# Patient Record
Sex: FEMALE | URBAN - METROPOLITAN AREA
[De-identification: names, ages, dates, MRNs, and addresses within clinical notes are randomized per-mention and may not be internally consistent; named-entity substitution may affect disease eponyms.]

---

## 2020-01-28 ENCOUNTER — OFFICE VISIT (OUTPATIENT)
Dept: ORTHOPEDICS | Facility: CLINIC | Age: 75
End: 2020-01-28
Payer: MEDICARE

## 2020-01-28 VITALS — TEMPERATURE: 98 F | HEIGHT: 66 IN | WEIGHT: 184.38 LBS | BODY MASS INDEX: 29.63 KG/M2

## 2020-01-28 DIAGNOSIS — S42.254A NONDISPLACED FRACTURE OF GREATER TUBEROSITY OF RIGHT HUMERUS, INITIAL ENCOUNTER FOR CLOSED FRACTURE: Primary | ICD-10-CM

## 2020-01-28 PROCEDURE — 1159F PR MEDICATION LIST DOCUMENTED IN MEDICAL RECORD: ICD-10-PCS | Mod: S$GLB,,, | Performed by: ORTHOPAEDIC SURGERY

## 2020-01-28 PROCEDURE — 99202 OFFICE O/P NEW SF 15 MIN: CPT | Mod: ICN,S$GLB,, | Performed by: ORTHOPAEDIC SURGERY

## 2020-01-28 PROCEDURE — 1159F MED LIST DOCD IN RCRD: CPT | Mod: S$GLB,,, | Performed by: ORTHOPAEDIC SURGERY

## 2020-01-28 PROCEDURE — 99202 PR OFFICE/OUTPT VISIT, NEW, LEVL II, 15-29 MIN: ICD-10-PCS | Mod: ICN,S$GLB,, | Performed by: ORTHOPAEDIC SURGERY

## 2020-01-28 RX ORDER — ERGOCALCIFEROL 1.25 MG/1
50000 CAPSULE ORAL
COMMUNITY

## 2020-01-28 RX ORDER — VITAMIN E 268 MG
400 CAPSULE ORAL DAILY
COMMUNITY

## 2020-01-28 RX ORDER — BENAZEPRIL HYDROCHLORIDE 20 MG/1
TABLET ORAL
COMMUNITY
Start: 2020-01-07

## 2020-01-28 RX ORDER — ACETAMINOPHEN 500 MG
500 TABLET ORAL EVERY 6 HOURS PRN
COMMUNITY

## 2020-01-28 RX ORDER — AMLODIPINE BESYLATE 5 MG/1
TABLET ORAL
COMMUNITY
Start: 2020-01-27

## 2020-01-28 RX ORDER — AMOXICILLIN 500 MG
CAPSULE ORAL DAILY
COMMUNITY

## 2020-01-28 RX ORDER — ASPIRIN 81 MG/1
81 TABLET ORAL DAILY
COMMUNITY

## 2020-01-28 RX ORDER — OXYBUTYNIN CHLORIDE 10 MG/1
TABLET, EXTENDED RELEASE ORAL
COMMUNITY
Start: 2020-01-28

## 2020-01-28 RX ORDER — SULFAMETHOXAZOLE AND TRIMETHOPRIM 800; 160 MG/1; MG/1
TABLET ORAL
COMMUNITY
Start: 2020-01-15

## 2020-01-28 RX ORDER — IBUPROFEN 100 MG/5ML
1000 SUSPENSION, ORAL (FINAL DOSE FORM) ORAL DAILY
COMMUNITY

## 2020-01-28 RX ORDER — DESOXIMETASONE 2.5 MG/G
1 CREAM TOPICAL 2 TIMES DAILY
COMMUNITY
Start: 2019-12-23

## 2020-01-28 RX ORDER — ROSUVASTATIN CALCIUM 10 MG/1
TABLET, COATED ORAL
COMMUNITY
Start: 2020-01-07

## 2020-01-28 RX ORDER — LANOLIN ALCOHOL/MO/W.PET/CERES
100 CREAM (GRAM) TOPICAL DAILY
COMMUNITY

## 2020-01-28 RX ORDER — TOLTERODINE 4 MG/1
CAPSULE, EXTENDED RELEASE ORAL
COMMUNITY
Start: 2020-01-24

## 2020-01-28 NOTE — PROGRESS NOTES
Subjective:      Patient ID: Lilliana East is a 74 y.o. female.    Chief Complaint: Pain of the Right Upper Arm    HPI 74-year-old lady who fell 18 days ago and injured her right shoulder.  She was finally x-rayed about a week ago and x-rays show a minimally displaced fracture of the greater tuberosity.  She has been in a sling since then.  She has a previous history of proximal humerus fracture treated with open reduction  Review of Systems   Constitution: Negative for fever and weight loss.   Cardiovascular: Negative for chest pain and leg swelling.   Musculoskeletal: Positive for joint pain and stiffness. Negative for arthritis, joint swelling and muscle weakness.   Gastrointestinal: Negative for change in bowel habit.   Genitourinary: Negative for bladder incontinence and hematuria.   Neurological: Negative for focal weakness, numbness, paresthesias and sensory change.         Objective:      Patient has tenderness with palpation of the proximal humerus.  Range of motion is not attempted because of the fracture. She has normal sensation and normal pulses    Ortho/SPM Exam            Assessment:       Encounter Diagnosis   Name Primary?    Nondisplaced fracture of greater tuberosity of right humerus, initial encounter for closed fracture Yes          Plan:       Lilliana was seen today for pain.    Diagnoses and all orders for this visit:    Nondisplaced fracture of greater tuberosity of right humerus, initial encounter for closed fracture     She is instructed and a pendulum exercise program.  She is encouraged with range of motion of her elbow.  She will be seen back in 2 weeks for x-rays of the shoulder

## 2020-02-11 ENCOUNTER — OFFICE VISIT (OUTPATIENT)
Dept: ORTHOPEDICS | Facility: CLINIC | Age: 75
End: 2020-02-11
Payer: MEDICARE

## 2020-02-11 DIAGNOSIS — S42.254A NONDISPLACED FRACTURE OF GREATER TUBEROSITY OF RIGHT HUMERUS, INITIAL ENCOUNTER FOR CLOSED FRACTURE: Primary | ICD-10-CM

## 2020-02-11 PROCEDURE — 99212 PR OFFICE/OUTPT VISIT, EST, LEVL II, 10-19 MIN: ICD-10-PCS | Mod: ICN,S$GLB,, | Performed by: ORTHOPAEDIC SURGERY

## 2020-02-11 PROCEDURE — 99212 OFFICE O/P EST SF 10 MIN: CPT | Mod: ICN,S$GLB,, | Performed by: ORTHOPAEDIC SURGERY

## 2020-02-11 NOTE — PROGRESS NOTES
Subjective:      Patient ID: Lilliana East is a 74 y.o. female.    Chief Complaint: Injury and Pain of the Right Upper Arm    HPI patient is here for follow-up for her right humeral tuberosity fracture.  She still complains of moderate discomfort.  She is 1 month out    ROS unchanged from prior visit      Objective:      She is  with palpation of the fracture site. Range of motion of the shoulder is not attempted      Ortho/SPM Exam            Assessment:       Encounter Diagnosis   Name Primary?    Nondisplaced fracture of greater tuberosity of right humerus, initial encounter for closed fracture Yes          Plan:       Lilliana was seen today for injury and pain.    Diagnoses and all orders for this visit:    Nondisplaced fracture of greater tuberosity of right humerus, initial encounter for closed fracture  -     X-ray Shoulder 2 or More Views Right; Future     X-ray shows the fracture to begin to consolidate.  Recommend return 2 weeks for x-ray and hopefully start her physical therapy at that time.  She is to continue pendulum exercises in the interim

## 2020-02-25 ENCOUNTER — OFFICE VISIT (OUTPATIENT)
Dept: ORTHOPEDICS | Facility: CLINIC | Age: 75
End: 2020-02-25
Payer: MEDICARE

## 2020-02-25 DIAGNOSIS — S42.254A NONDISPLACED FRACTURE OF GREATER TUBEROSITY OF RIGHT HUMERUS, INITIAL ENCOUNTER FOR CLOSED FRACTURE: Primary | ICD-10-CM

## 2020-02-25 PROCEDURE — 99212 OFFICE O/P EST SF 10 MIN: CPT | Mod: ICN,S$GLB,, | Performed by: ORTHOPAEDIC SURGERY

## 2020-02-25 PROCEDURE — 99212 PR OFFICE/OUTPT VISIT, EST, LEVL II, 10-19 MIN: ICD-10-PCS | Mod: ICN,S$GLB,, | Performed by: ORTHOPAEDIC SURGERY

## 2020-02-25 RX ORDER — METFORMIN HYDROCHLORIDE 500 MG/1
TABLET ORAL
COMMUNITY
Start: 2020-01-15

## 2020-02-25 RX ORDER — TRAMADOL HYDROCHLORIDE 50 MG/1
TABLET ORAL
COMMUNITY
Start: 2020-02-11

## 2020-02-25 RX ORDER — BUPROPION HYDROCHLORIDE 300 MG/1
TABLET ORAL
COMMUNITY
Start: 2020-01-15

## 2020-02-25 RX ORDER — DULOXETIN HYDROCHLORIDE 60 MG/1
CAPSULE, DELAYED RELEASE ORAL
COMMUNITY
Start: 2019-12-20

## 2020-02-25 NOTE — PROGRESS NOTES
Subjective:      Patient ID: Lilliana East is a 74 y.o. female.    Chief Complaint: Injury of the Right Upper Arm    HPI patient is now 6 weeks out from her right greater tuberosity fracture. She states that she still has occasional dull ache especially with weather changes    ROS unchanged from prior visit      Objective:      Passive range of motion shows 100° of flexion and 90° of abduction with 45° of extension.      Ortho/SPM Exam            Assessment:       Encounter Diagnosis   Name Primary?    Nondisplaced fracture of greater tuberosity of right humerus, initial encounter for closed fracture Yes          Plan:       Lilliana was seen today for injury.    Diagnoses and all orders for this visit:    Nondisplaced fracture of greater tuberosity of right humerus, initial encounter for closed fracture  -     X-ray Shoulder 2 or More Views Right; Future     X-ray shows the fracture continues to heal.    Arrangements were made for a course of physical therapy.  Return 3 weeks for motion check

## 2024-05-06 ENCOUNTER — TELEPHONE (OUTPATIENT)
Dept: UROLOGY | Facility: CLINIC | Age: 79
End: 2024-05-06
Payer: MEDICARE

## 2024-05-07 DIAGNOSIS — N39.0 UTI (URINARY TRACT INFECTION): Primary | ICD-10-CM

## 2024-05-10 ENCOUNTER — OFFICE VISIT (OUTPATIENT)
Dept: UROLOGY | Facility: CLINIC | Age: 79
End: 2024-05-10
Payer: MEDICARE

## 2024-05-10 VITALS
HEIGHT: 66 IN | SYSTOLIC BLOOD PRESSURE: 159 MMHG | HEART RATE: 86 BPM | BODY MASS INDEX: 29.62 KG/M2 | WEIGHT: 184.31 LBS | DIASTOLIC BLOOD PRESSURE: 83 MMHG

## 2024-05-10 DIAGNOSIS — N39.0 UTI (URINARY TRACT INFECTION): ICD-10-CM

## 2024-05-10 DIAGNOSIS — N39.0 RECURRENT UTI (URINARY TRACT INFECTION): Primary | ICD-10-CM

## 2024-05-10 DIAGNOSIS — N39.41 URGE INCONTINENCE OF URINE: ICD-10-CM

## 2024-05-10 LAB
BILIRUBIN, UA POC OHS: NEGATIVE
BLOOD, UA POC OHS: ABNORMAL
CLARITY, UA POC OHS: CLEAR
COLOR, UA POC OHS: YELLOW
GLUCOSE, UA POC OHS: NEGATIVE
KETONES, UA POC OHS: NEGATIVE
LEUKOCYTES, UA POC OHS: ABNORMAL
NITRITE, UA POC OHS: NEGATIVE
PH, UA POC OHS: 5.5
PROTEIN, UA POC OHS: ABNORMAL
SPECIFIC GRAVITY, UA POC OHS: <=1.005
UROBILINOGEN, UA POC OHS: 0.2

## 2024-05-10 PROCEDURE — 81003 URINALYSIS AUTO W/O SCOPE: CPT | Mod: QW,S$GLB,, | Performed by: FAMILY MEDICINE

## 2024-05-10 PROCEDURE — 99204 OFFICE O/P NEW MOD 45 MIN: CPT | Mod: S$GLB,,, | Performed by: FAMILY MEDICINE

## 2024-05-10 RX ORDER — PANTOPRAZOLE SODIUM 40 MG/1
TABLET, DELAYED RELEASE ORAL
COMMUNITY

## 2024-05-10 RX ORDER — SODIUM, POTASSIUM,MAG SULFATES 17.5-3.13G
SOLUTION, RECONSTITUTED, ORAL ORAL
COMMUNITY
Start: 2023-11-16

## 2024-05-10 RX ORDER — CEFDINIR 300 MG/1
CAPSULE ORAL
COMMUNITY
Start: 2024-05-06

## 2024-05-10 RX ORDER — GLUCOSAMINE/CHONDR SU A SOD 750-600 MG
TABLET ORAL
COMMUNITY

## 2024-05-10 RX ORDER — DULAGLUTIDE 0.75 MG/.5ML
INJECTION, SOLUTION SUBCUTANEOUS
COMMUNITY
Start: 2024-02-01

## 2024-05-10 RX ORDER — CLOBETASOL PROPIONATE 0.5 MG/G
EMULSION TOPICAL
COMMUNITY

## 2024-05-10 RX ORDER — OLMESARTAN MEDOXOMIL 40 MG/1
TABLET ORAL
COMMUNITY
Start: 2024-04-01

## 2024-05-10 RX ORDER — TRIAMCINOLONE ACETONIDE 1 MG/G
PASTE DENTAL
COMMUNITY

## 2024-05-10 RX ORDER — RISANKIZUMAB-RZAA 150 MG/ML
INJECTION SUBCUTANEOUS
COMMUNITY
Start: 2023-08-01

## 2024-05-10 RX ORDER — ACETAMINOPHEN AND PHENYLEPHRINE HCL 325; 5 MG/1; MG/1
TABLET ORAL
COMMUNITY

## 2024-05-11 RX ORDER — NITROFURANTOIN 25; 75 MG/1; MG/1
100 CAPSULE ORAL DAILY
Qty: 90 CAPSULE | Refills: 0 | Status: SHIPPED | OUTPATIENT
Start: 2024-05-11 | End: 2024-08-09

## 2024-05-11 NOTE — PROGRESS NOTES
Subjective:       Patient ID: Lilliana East is a 78 y.o. female.    Chief Complaint: Urinary Tract Infection      HPI: 78-year-old 78-year-old female patient presenting to the clinic to establish care for recurrent urinary tract infections.  Patient states she experiences a urinary tract infection approximately every month.  She recently just completed cefdinir.  She also has to wear a proximally 4 or more depends per day for urinary incontinence.  She is tried Ditropan and Myrbetriq 50 mg with no improvement in symptoms.  Patient is unsure whether or not urine cultures have been completed in the past.  Symptoms with infection include urgency, lower back pain, and frequency.         Past Medical History:   Past Medical History:   Diagnosis Date    Cancer     Depression     High cholesterol     Hypertension        Past Surgical Historical:   Past Surgical History:   Procedure Laterality Date    BREAST RECONSTRUCTION      HUMERUS FRACTURE SURGERY      MASTECTOMY          Medications:   Medication List with Changes/Refills   Current Medications    ACETAMINOPHEN (TYLENOL) 500 MG TABLET    Take 500 mg by mouth every 6 (six) hours as needed for Pain.    ASCORBIC ACID, VITAMIN C, (VITAMIN C) 1000 MG TABLET    Take 1,000 mg by mouth once daily.    ASCORBIC ACID/COLLAGEN HYDR (COLLAGEN PLUS VITAMIN C ORAL)    Take by mouth.    BUPROPION (WELLBUTRIN XL) 300 MG 24 HR TABLET        CEFDINIR (OMNICEF) 300 MG CAPSULE        CHOLECALCIFEROL, VITAMIN D3, 125 MCG (5,000 UNIT) TBDL    1 tab(s) orally once a day    CLOBETASOL-EMOLLIENT 0.05 % CREA    1 tara applied topically 2 times a day    CYANOCOBALAMIN (VITAMIN B-12) 1000 MCG TABLET    Take 100 mcg by mouth once daily.    CYANOCOBALAMIN-COBAMAMIDE (B-12 PLUS) 5,000-100 MCG SUBL    1 tab(s) sublingually once a day    DESOXIMETASONE (TOPICORT) 0.25 % CREAM    1 application 2 (two) times daily. Apply to affected area    ERGOCALCIFEROL (ERGOCALCIFEROL) 50,000 UNIT CAP    Take 50,000  Units by mouth every 7 days.    GLUCOSAM/CHOND-MSM1/C/BRIELLE/BOR (GLUCOSAMINE-CHOND-MSM COMPLEX ORAL)    Take by mouth.    GLUCOSAMINE HCL 1,500 MG TAB    1 tab(s) orally once a day    OLMESARTAN (BENICAR) 40 MG TABLET    1 tab(s) orally once a day    OMEGA-3 FATTY ACIDS/FISH OIL (FISH OIL-OMEGA-3 FATTY ACIDS) 300-1,000 MG CAPSULE    Take by mouth once daily.    PANTOPRAZOLE (PROTONIX) 40 MG TABLET    1 tab(s) orally once a day    RISANKIZUMAB-RZAA (SKYRIZI) 150 MG/ML SYRG        ROSUVASTATIN (CRESTOR) 10 MG TABLET        SUPREP BOWEL PREP KIT 17.5-3.13-1.6 GRAM SOLR    as directed Oral    TOLTERODINE (DETROL LA) 4 MG 24 HR CAPSULE        TRIAMCINOLONE ACETONIDE 0.1% (KENALOG) 0.1 % PASTE    as directed    TRULICITY 0.75 MG/0.5 ML PEN INJECTOR    as directed subcutaneously once a week    VITAMIN E 400 UNIT CAPSULE    Take 400 Units by mouth once daily.   Discontinued Medications    AMLODIPINE (NORVASC) 5 MG TABLET        ASPIRIN (ECOTRIN) 81 MG EC TABLET    Take 81 mg by mouth once daily.    BENAZEPRIL (LOTENSIN) 20 MG TABLET        DULOXETINE (CYMBALTA) 60 MG CAPSULE    TAKE 1 CAPSULE BY MOUTH EVERY DAY IN THE MORNING    METFORMIN (GLUCOPHAGE) 500 MG TABLET        OXYBUTYNIN (DITROPAN-XL) 10 MG 24 HR TABLET        SULFAMETHOXAZOLE-TRIMETHOPRIM 800-160MG (BACTRIM DS) 800-160 MG TAB        TRAMADOL (ULTRAM) 50 MG TABLET            Past Social History:   Social History     Socioeconomic History    Marital status:    Tobacco Use    Smoking status: Never   Substance and Sexual Activity    Alcohol use: Not Currently    Drug use: Never       Allergies: Review of patient's allergies indicates:  No Known Allergies     Family History: No family history on file.     Review of Systems:  Review of Systems   Constitutional:  Negative for activity change, appetite change, chills, diaphoresis, fatigue, fever and unexpected weight change.   HENT:  Negative for congestion, dental problem, drooling, ear discharge, ear pain,  facial swelling, hearing loss, mouth sores, nosebleeds, postnasal drip, rhinorrhea, sinus pressure, sinus pain, sneezing, sore throat, tinnitus, trouble swallowing and voice change.    Eyes:  Negative for photophobia, pain, discharge, redness, itching and visual disturbance.   Respiratory:  Negative for apnea, cough, choking, chest tightness, shortness of breath, wheezing and stridor.    Cardiovascular:  Negative for chest pain and leg swelling.   Gastrointestinal:  Negative for abdominal distention, abdominal pain, anal bleeding, blood in stool, constipation, diarrhea, nausea, rectal pain and vomiting.   Endocrine: Negative for cold intolerance, heat intolerance, polydipsia, polyphagia and polyuria.   Genitourinary:  Positive for dysuria, frequency, pelvic pain and urgency. Negative for decreased urine volume, difficulty urinating, dyspareunia, enuresis, flank pain, genital sores, hematuria, menstrual problem, vaginal bleeding, vaginal discharge and vaginal pain.   Musculoskeletal:  Negative for arthralgias, back pain, gait problem, joint swelling, myalgias, neck pain and neck stiffness.   Skin:  Negative for color change, pallor, rash and wound.   Allergic/Immunologic: Negative for environmental allergies, food allergies and immunocompromised state.   Neurological:  Negative for dizziness, tremors, seizures, syncope, facial asymmetry, speech difficulty, weakness, light-headedness, numbness and headaches.   Hematological:  Negative for adenopathy. Does not bruise/bleed easily.   Psychiatric/Behavioral:  Negative for agitation, behavioral problems, confusion, decreased concentration, dysphoric mood, hallucinations, self-injury, sleep disturbance and suicidal ideas. The patient is not nervous/anxious and is not hyperactive.        Physical Exam:  Physical Exam  Exam conducted with a chaperone present.   Constitutional:       Appearance: Normal appearance.   HENT:      Head: Normocephalic.      Nose: Nose normal.       Mouth/Throat:      Mouth: Mucous membranes are moist.      Pharynx: Oropharynx is clear.   Eyes:      Extraocular Movements: Extraocular movements intact.      Conjunctiva/sclera: Conjunctivae normal.      Pupils: Pupils are equal, round, and reactive to light.   Cardiovascular:      Rate and Rhythm: Normal rate and regular rhythm.      Pulses: Normal pulses.      Heart sounds: Normal heart sounds.   Pulmonary:      Effort: Pulmonary effort is normal.      Breath sounds: Normal breath sounds.   Abdominal:      General: Abdomen is flat. Bowel sounds are normal.      Palpations: Abdomen is soft.      Hernia: There is no hernia in the left inguinal area or right inguinal area.   Genitourinary:     General: Normal vulva.      Pubic Area: No rash or pubic lice.       Labia:         Right: No rash, tenderness, lesion or injury.         Left: No rash, tenderness, lesion or injury.       Urethra: No prolapse, urethral pain, urethral swelling or urethral lesion.      Rectum: Normal.   Musculoskeletal:         General: Normal range of motion.      Cervical back: Normal range of motion and neck supple.   Lymphadenopathy:      Lower Body: No right inguinal adenopathy. No left inguinal adenopathy.   Skin:     General: Skin is warm and dry.      Capillary Refill: Capillary refill takes less than 2 seconds.   Neurological:      General: No focal deficit present.      Mental Status: She is alert and oriented to person, place, and time. Mental status is at baseline.   Psychiatric:         Mood and Affect: Mood normal.         Behavior: Behavior normal.         Thought Content: Thought content normal.         Judgment: Judgment normal.         Assessment/Plan:     Recurrent urinary tract infection:  Urinalysis suspicious of infection today we will culture her urine and treat based on culture results.  Patient recently completed Omnicef.  At this point we will treat the current infection and begin 90 days of Macrobid following  completion.    Urinary urge incontinence:  PVR 35 mL.  Patient has failed Ditropan and Myrbetriq 50 mg.  Patient provided with 4 weeks of Gemtesa samples.  We will have the patient notify ourf symptoms improve on this medication and we can send a prescription to her pharmacy.  We did discuss Botox if she has not seeing improvement in urinary leakage and urgency.    Follow up in 6 months or sooner if needed.  Instructed patient to complete urine drop-off with our office if symptoms of infection develop.  Problem List Items Addressed This Visit    None  Visit Diagnoses       UTI (urinary tract infection)        Relevant Orders    POCT Urinalysis(Instrument) (Completed)    Urine culture

## 2024-05-12 LAB — URINE CULTURE, ROUTINE: NORMAL

## 2024-05-13 ENCOUNTER — TELEPHONE (OUTPATIENT)
Dept: UROLOGY | Facility: CLINIC | Age: 79
End: 2024-05-13
Payer: MEDICARE

## 2024-05-13 NOTE — TELEPHONE ENCOUNTER
Spoke with pt and informed of results.    ----- Message from Tran Argueta NP sent at 5/13/2024 10:10 AM CDT -----  Please notify patient of urine culture results.  Repeat urinalysis if symptoms of infection develop.    Mixed growth in a urine culture is consistant with normal   urogenital/urethral and/or colonizing bacterial marizol.  There are   several different types of bacteria present, which is usually   indicative of contamination of the urine.

## 2024-05-29 ENCOUNTER — CLINICAL SUPPORT (OUTPATIENT)
Dept: UROLOGY | Facility: CLINIC | Age: 79
End: 2024-05-29
Payer: MEDICARE

## 2024-05-29 ENCOUNTER — TELEPHONE (OUTPATIENT)
Dept: UROLOGY | Facility: CLINIC | Age: 79
End: 2024-05-29

## 2024-05-29 DIAGNOSIS — N39.0 RECURRENT UTI (URINARY TRACT INFECTION): Primary | ICD-10-CM

## 2024-05-29 LAB
BILIRUBIN, UA POC OHS: NEGATIVE
BLOOD, UA POC OHS: ABNORMAL
CLARITY, UA POC OHS: CLEAR
COLOR, UA POC OHS: YELLOW
GLUCOSE, UA POC OHS: NEGATIVE
KETONES, UA POC OHS: NEGATIVE
LEUKOCYTES, UA POC OHS: ABNORMAL
NITRITE, UA POC OHS: NEGATIVE
PH, UA POC OHS: 5.5
PROTEIN, UA POC OHS: 100
SPECIFIC GRAVITY, UA POC OHS: 1.02
UROBILINOGEN, UA POC OHS: 0.2

## 2024-05-29 PROCEDURE — 81003 URINALYSIS AUTO W/O SCOPE: CPT | Mod: QW,S$GLB,, | Performed by: UROLOGY

## 2024-05-29 RX ORDER — CEFDINIR 300 MG/1
300 CAPSULE ORAL DAILY
Qty: 30 CAPSULE | Refills: 6 | Status: SHIPPED | OUTPATIENT
Start: 2024-06-09

## 2024-05-29 RX ORDER — CEFDINIR 300 MG/1
300 CAPSULE ORAL 2 TIMES DAILY
Qty: 20 CAPSULE | Refills: 0 | Status: SHIPPED | OUTPATIENT
Start: 2024-05-29 | End: 2024-06-08

## 2024-05-29 NOTE — PROGRESS NOTES
Pt in clinic for drop off. Pt is symptomatic. We will culture urine, and prescribing Cefdinir bid X's 10. Once completed pt will start Cefdinir daily. Attempted to contact. YARELY

## 2024-05-29 NOTE — TELEPHONE ENCOUNTER
Spoke with liya to clarify to scripts. Pt is to take omnicef now for two weeks twice daily and when finished with that she will start taking omnicef once daily.    ----- Message from Aminata Newberry sent at 5/29/2024  2:13 PM CDT -----  Contact: Liya Salcedo  Type:  Pharmacy Calling to Clarify an RX    Name of Caller:Liya  Pharmacy Name:[facility]  Prescription Name:cefdinir (OMNICEF) 300 MG capsule  What do they need to clarify?:received two prescriptions  Best Call Back Number:[phone]  Additional Information: n/a

## 2024-06-03 ENCOUNTER — TELEPHONE (OUTPATIENT)
Dept: UROLOGY | Facility: CLINIC | Age: 79
End: 2024-06-03
Payer: MEDICARE

## 2024-06-03 LAB — URINE CULTURE, ROUTINE: NORMAL

## 2024-06-03 RX ORDER — CIPROFLOXACIN 500 MG/1
500 TABLET ORAL 2 TIMES DAILY
Qty: 20 TABLET | Refills: 0 | Status: SHIPPED | OUTPATIENT
Start: 2024-06-03 | End: 2024-06-13

## 2024-06-03 NOTE — TELEPHONE ENCOUNTER
Attempted to contact pt regarding results. Left detailed message and trc.    ----- Message from Tran Argueta NP sent at 6/3/2024  4:06 PM CDT -----  Please notify patient of urine culture results.  Cipro sent to her pharmacy based off of urine culture.  Continue daily antibiotic after completion of Cipro

## 2024-06-06 ENCOUNTER — TELEPHONE (OUTPATIENT)
Dept: UROLOGY | Facility: CLINIC | Age: 79
End: 2024-06-06
Payer: MEDICARE

## 2024-06-06 NOTE — TELEPHONE ENCOUNTER
Pt informed of urine culture and to stop omnicef and start cipro. Once she is done with cipro she will start the daily omnicef. Pt verbalized understanding.  ----- Message from Gisselle Garcia sent at 6/6/2024  9:48 AM CDT -----  Contact: aniya Powell is calling regarding 2 antibiotics prescribed for UTI.  She would like to know if she is to take both.  Please give her a call back at [phone]

## 2024-06-19 ENCOUNTER — TELEPHONE (OUTPATIENT)
Dept: UROLOGY | Facility: CLINIC | Age: 79
End: 2024-06-19
Payer: MEDICARE

## 2024-06-19 NOTE — TELEPHONE ENCOUNTER
Pt called stating she is having side effects from daily omnicef. She is having nausea/vomiting and talking out of her head. She was advised to discontinue this. She is also having severe urinary leakage. She has tried most oral abx and would like to proceed with botox. I offered to set pt up for this but she would like to discuss with office visit.----- Message from Kat Garcia sent at 6/19/2024 11:57 AM CDT -----  Contact: Patient  Patient called to consult with nurse or staff regarding some information the nurse requested about the illness she has been dealing with. She states she was told to contact the office regarding this and would like a call back. She can be reached at [phone]. Thanks/MR

## 2024-06-24 ENCOUNTER — OFFICE VISIT (OUTPATIENT)
Dept: UROLOGY | Facility: CLINIC | Age: 79
End: 2024-06-24
Payer: MEDICARE

## 2024-06-24 VITALS
HEART RATE: 64 BPM | SYSTOLIC BLOOD PRESSURE: 185 MMHG | BODY MASS INDEX: 29.75 KG/M2 | DIASTOLIC BLOOD PRESSURE: 96 MMHG | HEIGHT: 66 IN

## 2024-06-24 DIAGNOSIS — N39.41 URGE INCONTINENCE OF URINE: Primary | ICD-10-CM

## 2024-06-24 PROCEDURE — 99214 OFFICE O/P EST MOD 30 MIN: CPT | Mod: S$GLB,,, | Performed by: FAMILY MEDICINE

## 2024-06-26 NOTE — PROGRESS NOTES
Subjective:       Patient ID: Lilliana East is a 78 y.o. female.    Chief Complaint: overactive bladder      HPI: 78-year-old  female patient presenting to the clinic to establish care for recurrent urinary tract infections.  Patient states she experiences a urinary tract infection approximately every month.  She recently just completed ciprofloxacin.  She also has to wear a proximally 4 or more depends per day for urinary incontinence.  She has tried Ditropan and Myrbetriq 50 mg with no improvement in symptoms.  She was provided with 4 weeks of Gemtesa samples at her last visit however the patient does not recall taking this medication.  Unsure if her symptoms improved on this medicine  Patient is unsure whether or not urine cultures have been completed in the past.  Symptoms with infection include urgency, lower back pain, and frequency.         Past Medical History:   Past Medical History:   Diagnosis Date    Cancer     Depression     High cholesterol     Hypertension        Past Surgical Historical:   Past Surgical History:   Procedure Laterality Date    BREAST RECONSTRUCTION      HUMERUS FRACTURE SURGERY      MASTECTOMY          Medications:   Medication List with Changes/Refills   Current Medications    ACETAMINOPHEN (TYLENOL) 500 MG TABLET    Take 500 mg by mouth every 6 (six) hours as needed for Pain.    ASCORBIC ACID, VITAMIN C, (VITAMIN C) 1000 MG TABLET    Take 1,000 mg by mouth once daily.    ASCORBIC ACID/COLLAGEN HYDR (COLLAGEN PLUS VITAMIN C ORAL)    Take by mouth.    BUPROPION (WELLBUTRIN XL) 300 MG 24 HR TABLET        CEFDINIR (OMNICEF) 300 MG CAPSULE        CEFDINIR (OMNICEF) 300 MG CAPSULE    Take 1 capsule (300 mg total) by mouth once daily.    CHOLECALCIFEROL, VITAMIN D3, 125 MCG (5,000 UNIT) TBDL    1 tab(s) orally once a day    CLOBETASOL-EMOLLIENT 0.05 % CREA    1 tara applied topically 2 times a day    CYANOCOBALAMIN (VITAMIN B-12) 1000 MCG TABLET    Take 100 mcg by mouth once daily.     CYANOCOBALAMIN-COBAMAMIDE (B-12 PLUS) 5,000-100 MCG SUBL    1 tab(s) sublingually once a day    DESOXIMETASONE (TOPICORT) 0.25 % CREAM    1 application 2 (two) times daily. Apply to affected area    ERGOCALCIFEROL (ERGOCALCIFEROL) 50,000 UNIT CAP    Take 50,000 Units by mouth every 7 days.    GLUCOSAM/CHOND-MSM1/C/BRIELLE/BOR (GLUCOSAMINE-CHOND-MSM COMPLEX ORAL)    Take by mouth.    GLUCOSAMINE HCL 1,500 MG TAB    1 tab(s) orally once a day    NITROFURANTOIN, MACROCRYSTAL-MONOHYDRATE, (MACROBID) 100 MG CAPSULE    Take 1 capsule (100 mg total) by mouth once daily.    OLMESARTAN (BENICAR) 40 MG TABLET    1 tab(s) orally once a day    OMEGA-3 FATTY ACIDS/FISH OIL (FISH OIL-OMEGA-3 FATTY ACIDS) 300-1,000 MG CAPSULE    Take by mouth once daily.    PANTOPRAZOLE (PROTONIX) 40 MG TABLET    1 tab(s) orally once a day    RISANKIZUMAB-RZAA (SKYRIZI) 150 MG/ML SYRG        ROSUVASTATIN (CRESTOR) 10 MG TABLET        SUPREP BOWEL PREP KIT 17.5-3.13-1.6 GRAM SOLR    as directed Oral    TOLTERODINE (DETROL LA) 4 MG 24 HR CAPSULE        TRIAMCINOLONE ACETONIDE 0.1% (KENALOG) 0.1 % PASTE    as directed    TRULICITY 0.75 MG/0.5 ML PEN INJECTOR    as directed subcutaneously once a week    VITAMIN E 400 UNIT CAPSULE    Take 400 Units by mouth once daily.        Past Social History:   Social History     Socioeconomic History    Marital status:    Tobacco Use    Smoking status: Never   Substance and Sexual Activity    Alcohol use: Not Currently    Drug use: Never       Allergies:   Review of patient's allergies indicates:   Allergen Reactions    Omnicef [cefdinir] Diarrhea     Disoriented        Family History: No family history on file.     Review of Systems:  Review of Systems   Constitutional:  Negative for activity change, appetite change, chills, diaphoresis, fatigue, fever and unexpected weight change.   HENT:  Negative for congestion, dental problem, drooling, ear discharge, ear pain, facial swelling, hearing loss, mouth sores,  nosebleeds, postnasal drip, rhinorrhea, sinus pressure, sinus pain, sneezing, sore throat, tinnitus, trouble swallowing and voice change.    Eyes:  Negative for photophobia, pain, discharge, redness, itching and visual disturbance.   Respiratory:  Negative for apnea, cough, choking, chest tightness, shortness of breath, wheezing and stridor.    Cardiovascular:  Negative for chest pain and leg swelling.   Gastrointestinal:  Negative for abdominal distention, abdominal pain, anal bleeding, blood in stool, constipation, diarrhea, nausea, rectal pain and vomiting.   Endocrine: Negative for cold intolerance, heat intolerance, polydipsia, polyphagia and polyuria.   Genitourinary:  Positive for dysuria, frequency, pelvic pain and urgency. Negative for decreased urine volume, difficulty urinating, dyspareunia, enuresis, flank pain, genital sores, hematuria, menstrual problem, vaginal bleeding, vaginal discharge and vaginal pain.   Musculoskeletal:  Negative for arthralgias, back pain, gait problem, joint swelling, myalgias, neck pain and neck stiffness.   Skin:  Negative for color change, pallor, rash and wound.   Allergic/Immunologic: Negative for environmental allergies, food allergies and immunocompromised state.   Neurological:  Negative for dizziness, tremors, seizures, syncope, facial asymmetry, speech difficulty, weakness, light-headedness, numbness and headaches.   Hematological:  Negative for adenopathy. Does not bruise/bleed easily.   Psychiatric/Behavioral:  Negative for agitation, behavioral problems, confusion, decreased concentration, dysphoric mood, hallucinations, self-injury, sleep disturbance and suicidal ideas. The patient is not nervous/anxious and is not hyperactive.        Physical Exam:  Physical Exam  Exam conducted with a chaperone present.   Constitutional:       Appearance: Normal appearance.   HENT:      Head: Normocephalic.      Nose: Nose normal.      Mouth/Throat:      Mouth: Mucous membranes  are moist.      Pharynx: Oropharynx is clear.   Eyes:      Extraocular Movements: Extraocular movements intact.      Conjunctiva/sclera: Conjunctivae normal.      Pupils: Pupils are equal, round, and reactive to light.   Cardiovascular:      Rate and Rhythm: Normal rate and regular rhythm.      Pulses: Normal pulses.      Heart sounds: Normal heart sounds.   Pulmonary:      Effort: Pulmonary effort is normal.      Breath sounds: Normal breath sounds.   Abdominal:      General: Abdomen is flat. Bowel sounds are normal.      Palpations: Abdomen is soft.      Hernia: There is no hernia in the left inguinal area or right inguinal area.   Genitourinary:     General: Normal vulva.      Pubic Area: No rash or pubic lice.       Labia:         Right: No rash, tenderness, lesion or injury.         Left: No rash, tenderness, lesion or injury.       Urethra: No prolapse, urethral pain, urethral swelling or urethral lesion.      Rectum: Normal.   Musculoskeletal:         General: Normal range of motion.      Cervical back: Normal range of motion and neck supple.   Lymphadenopathy:      Lower Body: No right inguinal adenopathy. No left inguinal adenopathy.   Skin:     General: Skin is warm and dry.      Capillary Refill: Capillary refill takes less than 2 seconds.   Neurological:      General: No focal deficit present.      Mental Status: She is alert and oriented to person, place, and time. Mental status is at baseline.   Psychiatric:         Mood and Affect: Mood normal.         Behavior: Behavior normal.         Thought Content: Thought content normal.         Judgment: Judgment normal.         Assessment/Plan:     Recurrent urinary tract infection:  Urinalysis negative for infection today  Patient recently completed Omnicef.  In the past the patient was started on 90 days of Macrobid.  The patient was unsure if she took this medicine risks continuing to take this medicine.  Instructed her to take Macrobid 100 mg daily to  prevent infection    Urinary urge incontinence:  Patient has failed Ditropan and Myrbetriq 50 mg.  Patient provided with 4 weeks of Gemtesa samples.  We will have the patient notify ourf symptoms improve on this medication and we can send a prescription to her pharmacy.  We did discuss Botox if she has not seeing improvement in urinary leakage and urgency.    Follow up in 1 month for further discussion about Gemtesa or Botox. Instructed patient to complete urine drop-off with our office if symptoms of infection develop.  Problem List Items Addressed This Visit    None

## 2024-06-29 ENCOUNTER — OUTSIDE PLACE OF SERVICE (OUTPATIENT)
Dept: UROLOGY | Facility: CLINIC | Age: 79
End: 2024-06-29
Payer: MEDICARE

## 2024-06-29 ENCOUNTER — OUTSIDE PLACE OF SERVICE (OUTPATIENT)
Dept: UROLOGY | Facility: CLINIC | Age: 79
End: 2024-06-29

## 2024-07-08 ENCOUNTER — OFFICE VISIT (OUTPATIENT)
Dept: UROLOGY | Facility: CLINIC | Age: 79
End: 2024-07-08
Payer: MEDICARE

## 2024-07-08 VITALS
HEART RATE: 70 BPM | SYSTOLIC BLOOD PRESSURE: 122 MMHG | WEIGHT: 147 LBS | BODY MASS INDEX: 22.28 KG/M2 | DIASTOLIC BLOOD PRESSURE: 78 MMHG | HEIGHT: 68 IN | OXYGEN SATURATION: 98 %

## 2024-07-08 DIAGNOSIS — N20.0 KIDNEY STONE: Primary | ICD-10-CM

## 2024-07-08 DIAGNOSIS — N39.0 RECURRENT UTI (URINARY TRACT INFECTION): ICD-10-CM

## 2024-07-08 LAB
BILIRUBIN, UA POC OHS: NEGATIVE
BLOOD, UA POC OHS: ABNORMAL
CLARITY, UA POC OHS: ABNORMAL
COLOR, UA POC OHS: YELLOW
GLUCOSE, UA POC OHS: NEGATIVE
KETONES, UA POC OHS: NEGATIVE
LEUKOCYTES, UA POC OHS: ABNORMAL
NITRITE, UA POC OHS: NEGATIVE
PH, UA POC OHS: 5.5
PROTEIN, UA POC OHS: >=300
SPECIFIC GRAVITY, UA POC OHS: 1.02
UROBILINOGEN, UA POC OHS: 0.2

## 2024-07-08 PROCEDURE — 81003 URINALYSIS AUTO W/O SCOPE: CPT | Mod: QW,S$GLB,, | Performed by: FAMILY MEDICINE

## 2024-07-08 PROCEDURE — 99215 OFFICE O/P EST HI 40 MIN: CPT | Mod: S$GLB,,, | Performed by: FAMILY MEDICINE

## 2024-07-08 RX ORDER — NITROFURANTOIN 25; 75 MG/1; MG/1
100 CAPSULE ORAL DAILY
Qty: 90 CAPSULE | Refills: 0 | Status: SHIPPED | OUTPATIENT
Start: 2024-07-08 | End: 2024-10-06

## 2024-07-08 RX ORDER — LEVOFLOXACIN 500 MG/1
500 TABLET, FILM COATED ORAL DAILY
COMMUNITY
Start: 2024-07-01 | End: 2024-07-08

## 2024-07-08 RX ORDER — AMLODIPINE BESYLATE 5 MG/1
5 TABLET ORAL 2 TIMES DAILY
COMMUNITY
Start: 2024-07-02

## 2024-07-08 NOTE — PROGRESS NOTES
Subjective:       Patient ID: Lilliana East is a 78 y.o. female.    Chief Complaint: No chief complaint on file.      HPI: 78-year-old female patient presenting to the clinic follow up.  Patient had left stent placement with Dr. Granados on 06/29/2024.  Patient had severe left-sided hydronephrosis with a 1.9 x 1.4 x 1 cm obstructing calculus at the left renal pelvis as well as a 7 mm and 5 mm calculi at the inferior pole of the left kidney.         Past Medical History:   Past Medical History:   Diagnosis Date    Cancer     Depression     High cholesterol     Hypertension        Past Surgical Historical:   Past Surgical History:   Procedure Laterality Date    BREAST RECONSTRUCTION      HUMERUS FRACTURE SURGERY      MASTECTOMY          Medications:   Medication List with Changes/Refills   Current Medications    ACETAMINOPHEN (TYLENOL) 500 MG TABLET    Take 500 mg by mouth every 6 (six) hours as needed for Pain.    AMLODIPINE (NORVASC) 5 MG TABLET    Take 5 mg by mouth 2 (two) times daily.    ASCORBIC ACID, VITAMIN C, (VITAMIN C) 1000 MG TABLET    Take 1,000 mg by mouth once daily.    ASCORBIC ACID/COLLAGEN HYDR (COLLAGEN PLUS VITAMIN C ORAL)    Take by mouth.    BUPROPION (WELLBUTRIN XL) 300 MG 24 HR TABLET        CEFDINIR (OMNICEF) 300 MG CAPSULE        CEFDINIR (OMNICEF) 300 MG CAPSULE    Take 1 capsule (300 mg total) by mouth once daily.    CHOLECALCIFEROL, VITAMIN D3, 125 MCG (5,000 UNIT) TBDL    1 tab(s) orally once a day    CLOBETASOL-EMOLLIENT 0.05 % CREA    1 tara applied topically 2 times a day    CYANOCOBALAMIN (VITAMIN B-12) 1000 MCG TABLET    Take 100 mcg by mouth once daily.    CYANOCOBALAMIN-COBAMAMIDE (B-12 PLUS) 5,000-100 MCG SUBL    1 tab(s) sublingually once a day    DESOXIMETASONE (TOPICORT) 0.25 % CREAM    1 application 2 (two) times daily. Apply to affected area    DOCUSATE SODIUM (COLACE) 50 MG CAPSULE    Take by mouth 2 (two) times daily.    ERGOCALCIFEROL (ERGOCALCIFEROL) 50,000 UNIT CAP     Take 50,000 Units by mouth every 7 days.    FEXOFENADINE 30 MG TBDL    Take by mouth once daily. 0.5 tablet in am, 0.5 tablet in pm    GLUCOSAM/CHOND-MSM1/C/BRIELLE/BOR (GLUCOSAMINE-CHOND-MSM COMPLEX ORAL)    Take by mouth.    GLUCOSAMINE HCL 1,500 MG TAB    1 tab(s) orally once a day    LACTOBACILLUS RHAMNOSUS GG ORAL    Take 1 capsule by mouth once daily. 60 million units    OLMESARTAN (BENICAR) 40 MG TABLET    1 tab(s) orally once a day    OMEGA-3 FATTY ACIDS/FISH OIL (FISH OIL-OMEGA-3 FATTY ACIDS) 300-1,000 MG CAPSULE    Take by mouth once daily.    PANTOPRAZOLE (PROTONIX) 40 MG TABLET    1 tab(s) orally once a day    RISANKIZUMAB-RZAA (SKYRIZI) 150 MG/ML SYRG        ROSUVASTATIN (CRESTOR) 10 MG TABLET        SUPREP BOWEL PREP KIT 17.5-3.13-1.6 GRAM SOLR    as directed Oral    TOLTERODINE (DETROL LA) 4 MG 24 HR CAPSULE        TRIAMCINOLONE ACETONIDE 0.1% (KENALOG) 0.1 % PASTE    as directed    TRULICITY 0.75 MG/0.5 ML PEN INJECTOR    as directed subcutaneously once a week    UNABLE TO FIND    Take by mouth once daily. medication name: Biotin    VITAMIN E 400 UNIT CAPSULE    Take 400 Units by mouth once daily.   Changed and/or Refilled Medications    Modified Medication Previous Medication    NITROFURANTOIN, MACROCRYSTAL-MONOHYDRATE, (MACROBID) 100 MG CAPSULE nitrofurantoin, macrocrystal-monohydrate, (MACROBID) 100 MG capsule       Take 1 capsule (100 mg total) by mouth once daily.    Take 1 capsule (100 mg total) by mouth once daily.   Discontinued Medications    LEVOFLOXACIN (LEVAQUIN) 500 MG TABLET    Take 500 mg by mouth once daily.        Past Social History:   Social History     Socioeconomic History    Marital status:    Tobacco Use    Smoking status: Never   Substance and Sexual Activity    Alcohol use: Not Currently    Drug use: Never       Allergies:   Review of patient's allergies indicates:   Allergen Reactions    Omnicef [cefdinir] Diarrhea     Disoriented        Family History: No family history  on file.     Review of Systems:  Review of Systems   Constitutional:  Negative for activity change, appetite change, chills, diaphoresis, fatigue, fever and unexpected weight change.   HENT:  Negative for congestion, dental problem, drooling, ear discharge, ear pain, facial swelling, hearing loss, mouth sores, nosebleeds, postnasal drip, rhinorrhea, sinus pressure, sinus pain, sneezing, sore throat, tinnitus, trouble swallowing and voice change.    Eyes:  Negative for photophobia, pain, discharge, redness, itching and visual disturbance.   Respiratory:  Negative for apnea, cough, choking, chest tightness, shortness of breath, wheezing and stridor.    Cardiovascular:  Negative for chest pain and leg swelling.   Gastrointestinal:  Negative for abdominal distention, abdominal pain, anal bleeding, blood in stool, constipation, diarrhea, nausea, rectal pain and vomiting.   Endocrine: Negative for cold intolerance, heat intolerance, polydipsia, polyphagia and polyuria.   Genitourinary:  Positive for flank pain. Negative for decreased urine volume, difficulty urinating, dyspareunia, dysuria, enuresis, frequency, genital sores, hematuria, menstrual problem, pelvic pain, urgency, vaginal bleeding, vaginal discharge and vaginal pain.   Musculoskeletal:  Negative for arthralgias, back pain, gait problem, joint swelling, myalgias, neck pain and neck stiffness.   Skin:  Negative for color change, pallor, rash and wound.   Allergic/Immunologic: Negative for environmental allergies, food allergies and immunocompromised state.   Neurological:  Negative for dizziness, tremors, seizures, syncope, facial asymmetry, speech difficulty, weakness, light-headedness, numbness and headaches.   Hematological:  Negative for adenopathy. Does not bruise/bleed easily.   Psychiatric/Behavioral:  Negative for agitation, behavioral problems, confusion, decreased concentration, dysphoric mood, hallucinations, self-injury, sleep disturbance and  suicidal ideas. The patient is not nervous/anxious and is not hyperactive.        Physical Exam:  Physical Exam  Exam conducted with a chaperone present.   Constitutional:       Appearance: Normal appearance.   HENT:      Head: Normocephalic.      Nose: Nose normal.      Mouth/Throat:      Mouth: Mucous membranes are moist.      Pharynx: Oropharynx is clear.   Eyes:      Extraocular Movements: Extraocular movements intact.      Conjunctiva/sclera: Conjunctivae normal.      Pupils: Pupils are equal, round, and reactive to light.   Cardiovascular:      Rate and Rhythm: Normal rate and regular rhythm.      Pulses: Normal pulses.      Heart sounds: Normal heart sounds.   Pulmonary:      Effort: Pulmonary effort is normal.      Breath sounds: Normal breath sounds.   Abdominal:      General: Abdomen is flat. Bowel sounds are normal.      Palpations: Abdomen is soft.      Hernia: There is no hernia in the left inguinal area or right inguinal area.   Genitourinary:     General: Normal vulva.      Pubic Area: No rash or pubic lice.       Labia:         Right: No rash, tenderness, lesion or injury.         Left: No rash, tenderness, lesion or injury.       Urethra: No prolapse, urethral pain, urethral swelling or urethral lesion.      Rectum: Normal.   Musculoskeletal:         General: Normal range of motion.      Cervical back: Normal range of motion and neck supple.   Lymphadenopathy:      Lower Body: No right inguinal adenopathy. No left inguinal adenopathy.   Skin:     General: Skin is warm and dry.      Capillary Refill: Capillary refill takes less than 2 seconds.   Neurological:      General: No focal deficit present.      Mental Status: She is alert and oriented to person, place, and time. Mental status is at baseline.   Psychiatric:         Mood and Affect: Mood normal.         Behavior: Behavior normal.         Thought Content: Thought content normal.         Judgment: Judgment normal.         Assessment/Plan:      Kidney stone:  Left stent in place at this time.  Patient had IV antibiotics as well as Levaquin 500 mg for 7 days.  We will set the patient up for left PCNL with Dr. Granados for multiple calcifications in the left kidney at the next available date.  Patient will restart daily Macrobid prophylactically.    Problem List Items Addressed This Visit    None  Visit Diagnoses       Recurrent UTI (urinary tract infection)        Relevant Orders    POCT Urinalysis(Instrument) (Completed)    Kidney stone        Relevant Orders    Ambulatory referral/consult to Interventional Radiology    Ambulatory Referral to External Surgery

## 2024-07-18 ENCOUNTER — TELEPHONE (OUTPATIENT)
Dept: UROLOGY | Facility: CLINIC | Age: 79
End: 2024-07-18
Payer: MEDICARE

## 2024-07-18 NOTE — TELEPHONE ENCOUNTER
Ankush canceled on 24th. Pt states she is having surgery this day.    ----- Message from Avelina Nair sent at 7/18/2024  9:52 AM CDT -----  Contact: pt   Pt  Elias calling about appt for 7/24 stating pt having kidney stone removed at hospital that day.  Pt can be reached at [phone]

## 2024-07-19 LAB
ANION GAP SERPL CALC-SCNC: 6 MMOL/L (ref 3–11)
APPEARANCE, UA: CLEAR
BACTERIA SPEC CULT: ABNORMAL /HPF
BASOPHILS NFR BLD: 0.4 % (ref 0–3)
BILIRUB UR QL STRIP: NEGATIVE MG/DL
BUN SERPL-MCNC: 19 MG/DL (ref 7–18)
BUN/CREAT SERPL: 26.76 RATIO (ref 7–18)
CALCIUM OXALATE CRYSTALS, UA: ABNORMAL /LPF
CALCIUM SERPL-MCNC: 11 MG/DL (ref 8.8–10.5)
CHLORIDE SERPL-SCNC: 101 MMOL/L (ref 100–108)
CO2 SERPL-SCNC: 28 MMOL/L (ref 21–32)
COLOR UR: ABNORMAL
CREAT SERPL-MCNC: 0.71 MG/DL (ref 0.55–1.02)
EOSINOPHIL NFR BLD: 1.8 % (ref 1–3)
ERYTHROCYTE [DISTWIDTH] IN BLOOD BY AUTOMATED COUNT: 16 % (ref 12.5–18)
GFR ESTIMATION: > 60
GLUCOSE (UA): NORMAL MG/DL
GLUCOSE SERPL-MCNC: 99 MG/DL (ref 70–110)
HCT VFR BLD AUTO: 38.9 % (ref 37–47)
HGB BLD-MCNC: 12.6 G/DL (ref 12–16)
HGB UR QL STRIP: 250 /UL
KETONES UR QL STRIP: NEGATIVE MG/DL
LEUKOCYTE ESTERASE UR QL STRIP: 100 /UL
LYMPHOCYTES NFR BLD: 21.9 % (ref 25–40)
MCH RBC QN AUTO: 25.7 PG (ref 27–31.2)
MCHC RBC AUTO-ENTMCNC: 32.4 G/DL (ref 31.8–35.4)
MCV RBC AUTO: 79.4 FL (ref 80–97)
MONOCYTES NFR BLD: 5.7 % (ref 1–15)
MUCUS URINE: ABNORMAL /LPF
NEUTROPHILS # BLD AUTO: 5.04 10*3/UL (ref 1.8–7.7)
NEUTROPHILS NFR BLD: 69.8 % (ref 37–80)
NITRITE UR QL STRIP: NEGATIVE
NUCLEATED RED BLOOD CELLS: 0 %
PH UR STRIP: 6 PH (ref 5–9)
PLATELETS: 157 10*3/UL (ref 142–424)
POTASSIUM SERPL-SCNC: 4.2 MMOL/L (ref 3.6–5.2)
PROT UR QL STRIP: 30 MG/DL
RBC # BLD AUTO: 4.9 10*6/UL (ref 4.2–5.4)
RBC #/AREA URNS HPF: ABNORMAL /HPF (ref 0–2)
SERVICE COMMENT 03: ABNORMAL
SODIUM BLD-SCNC: 135 MMOL/L (ref 135–145)
SP GR UR STRIP: 1.01 (ref 1–1.03)
SPECIMEN COLLECTION METHOD, URINE: ABNORMAL
SQUAMOUS EPITHELIAL, UA: ABNORMAL /LPF
UROBILINOGEN UR STRIP-ACNC: NORMAL MG/DL
WBC # BLD: 7.2 10*3/UL (ref 4.6–10.2)
WBC #/AREA URNS HPF: ABNORMAL /HPF (ref 0–5)

## 2024-07-23 DIAGNOSIS — N39.0 RECURRENT UTI (URINARY TRACT INFECTION): ICD-10-CM

## 2024-07-23 DIAGNOSIS — N20.0 KIDNEY STONE: Primary | ICD-10-CM

## 2024-07-23 LAB — GLUCOSE SERPL-MCNC: 83 MG/DL (ref 70–105)

## 2024-07-24 ENCOUNTER — OUTSIDE PLACE OF SERVICE (OUTPATIENT)
Dept: UROLOGY | Facility: CLINIC | Age: 79
End: 2024-07-24

## 2024-07-24 DIAGNOSIS — N20.0 KIDNEY STONE: Primary | ICD-10-CM

## 2024-07-24 LAB — GLUCOSE SERPL-MCNC: 97 MG/DL (ref 70–105)

## 2024-07-24 PROCEDURE — 50081 PERQ NL/PL LITHOTRP CPLX>2CM: CPT | Mod: LT,,, | Performed by: UROLOGY

## 2024-07-24 RX ORDER — CIPROFLOXACIN 500 MG/1
500 TABLET ORAL 2 TIMES DAILY
Qty: 6 TABLET | Refills: 0 | Status: SHIPPED | OUTPATIENT
Start: 2024-07-24 | End: 2024-07-27

## 2024-07-24 RX ORDER — HYDROCODONE BITARTRATE AND ACETAMINOPHEN 10; 325 MG/1; MG/1
1 TABLET ORAL EVERY 6 HOURS PRN
Qty: 12 TABLET | Refills: 0 | Status: SHIPPED | OUTPATIENT
Start: 2024-07-24

## 2024-07-25 LAB
ANION GAP SERPL CALC-SCNC: 7 MMOL/L (ref 3–11)
BASOPHILS NFR BLD: 0.2 % (ref 0–3)
BUN SERPL-MCNC: 14 MG/DL (ref 7–18)
BUN/CREAT SERPL: 20 RATIO (ref 7–18)
CALCIUM SERPL-MCNC: 10 MG/DL (ref 8.8–10.5)
CHLORIDE SERPL-SCNC: 104 MMOL/L (ref 100–108)
CO2 SERPL-SCNC: 28 MMOL/L (ref 21–32)
CREAT SERPL-MCNC: 0.7 MG/DL (ref 0.55–1.02)
EOSINOPHIL NFR BLD: 2.1 % (ref 1–3)
ERYTHROCYTE [DISTWIDTH] IN BLOOD BY AUTOMATED COUNT: 15.8 % (ref 12.5–18)
GFR ESTIMATION: > 60
GLUCOSE SERPL-MCNC: 122 MG/DL (ref 70–110)
HCT VFR BLD AUTO: 35.1 % (ref 37–47)
HGB BLD-MCNC: 11.6 G/DL (ref 12–16)
LYMPHOCYTES NFR BLD: 25.3 % (ref 25–40)
MCH RBC QN AUTO: 26.4 PG (ref 27–31.2)
MCHC RBC AUTO-ENTMCNC: 33 G/DL (ref 31.8–35.4)
MCV RBC AUTO: 80 FL (ref 80–97)
MONOCYTES NFR BLD: 7.7 % (ref 1–15)
NEUTROPHILS # BLD AUTO: 5.31 10*3/UL (ref 1.8–7.7)
NEUTROPHILS NFR BLD: 64.3 % (ref 37–80)
NUCLEATED RED BLOOD CELLS: 0 %
PLATELETS: 133 10*3/UL (ref 142–424)
POTASSIUM SERPL-SCNC: 3.5 MMOL/L (ref 3.6–5.2)
RBC # BLD AUTO: 4.39 10*6/UL (ref 4.2–5.4)
SODIUM BLD-SCNC: 139 MMOL/L (ref 135–145)
WBC # BLD: 8.3 10*3/UL (ref 4.6–10.2)

## 2024-07-28 LAB
CALCULI COMPOSITION: NORMAL
CALCULI DESCRIPTION: NORMAL
CALCULI MASS: 719 MG
CALCULI PDF: NORMAL

## 2024-08-14 ENCOUNTER — TELEPHONE (OUTPATIENT)
Dept: UROLOGY | Facility: CLINIC | Age: 79
End: 2024-08-14
Payer: MEDICARE

## 2024-08-15 ENCOUNTER — PROCEDURE VISIT (OUTPATIENT)
Dept: UROLOGY | Facility: CLINIC | Age: 79
End: 2024-08-15
Payer: MEDICARE

## 2024-08-15 VITALS
RESPIRATION RATE: 20 BRPM | SYSTOLIC BLOOD PRESSURE: 112 MMHG | OXYGEN SATURATION: 98 % | DIASTOLIC BLOOD PRESSURE: 63 MMHG | HEIGHT: 68 IN | BODY MASS INDEX: 23.04 KG/M2 | HEART RATE: 67 BPM | WEIGHT: 152 LBS

## 2024-08-15 DIAGNOSIS — N39.41 URGE INCONTINENCE OF URINE: Primary | ICD-10-CM

## 2024-08-15 DIAGNOSIS — N20.0 KIDNEY STONE: ICD-10-CM

## 2024-08-15 RX ORDER — TRAMADOL HYDROCHLORIDE 50 MG/1
50 TABLET ORAL EVERY 4 HOURS PRN
COMMUNITY
Start: 2024-07-01

## 2024-08-15 RX ORDER — DESOXIMETASONE 2.5 MG/G
1 CREAM TOPICAL 2 TIMES DAILY
COMMUNITY

## 2024-08-15 RX ORDER — BUPROPION HYDROCHLORIDE 300 MG/1
300 TABLET ORAL DAILY
COMMUNITY

## 2024-08-15 RX ORDER — MIRABEGRON 50 MG/1
50 TABLET, FILM COATED, EXTENDED RELEASE ORAL DAILY
COMMUNITY
Start: 2024-07-10

## 2024-08-15 NOTE — PATIENT INSTRUCTIONS
Patient Education       Cystoscopy Discharge Instructions   About this topic   Your kidneys make urine. It is stored in your bladder. The urethra is a tube at the bottom of the bladder. Urine flows out of this tube. Sometimes, there is a blockage and urine is not able to leave the body.  A cystoscopy is a procedure that lets the doctor see the inside of your bladder and urethra. The doctor does it to:  Look for stones or tumors blocking the bladder and urethra  Look for changes or injury inside the bladder  Take a tissue sample from the inside of your bladder  Look for reasons for blood in the urine, pain with urination, or why you are passing urine often  Look for prostate problems     What care is needed at home?   Ask your doctor what you need to do when you go home. Make sure you ask questions if you do not understand what the doctor says. This way you will know what you need to do.  Take a warm bath or use a warm wet washcloth over the opening to the urethra. This will help to ease any pain. Do this as needed.  Drink 6 to 8 glasses of water a day and 3 to 4 glasses in the first few hours after the procedure to flush out your bladder and reduce irritation.  You may see some blood in your urine for a few days. This is normal.  Empty your bladder as soon as you feel the need to. Don't delay going to the bathroom. It stretches and weakens the bladder.  What follow-up care is needed?   Your doctor may ask you to make visits to the office to check on your progress. Be sure to keep these visits.  If you had a biopsy, talk with your doctor about the results.  What drugs may be needed?   The doctor may order drugs to:  Help with pain  Fight an infection  Help with bladder spasms  Will physical activity be limited?   Talk to your doctor about when you may go back to your normal activities like work, driving, or sex.  What problems could happen?   Bleeding  Infection  Injury to the bladder and urethra  Discomfort in the  urethra area  Burning sensation for a short time  Upset stomach  When do I need to call the doctor?   Signs of infection. These include a fever of 100.4°F (38°C) or higher, chills, pain with passing urine.  Pain that does not go away even with drugs or that lasts longer than 2 days  Too much blood in your urine  Passing large dime-sized clots  Cloudy urine  Little or no urine or not able to pass urine  Abdominal pain and nausea  Teach Back: Helping You Understand   The Teach Back Method helps you understand the information we are giving you. After you talk with the staff, tell them in your own words what you learned. This helps to make sure the staff has described each thing clearly. It also helps to explain things that may have been confusing. Before going home, make sure you can do these:  I can tell you about my procedure.  I can tell you what may help ease my pain.  I can tell you what I will do if I have a fever, chills, or am not able to pass urine.  Where can I learn more?   [employer]  https://www.cancer.org/treatment/understanding-your-diagnosis/tests/endoscopy/cystoscopy.html   [employer]  https://www.cancerresearchuk.org/about-cancer/bladder-cancer/getting-diagnosed/tests-diagnose/cystoscopy   NHS Choices  http://www.nhs.uk/conditions/Cystoscopy/Pages/Introduction.aspx   Last Reviewed Date   2021-04-22  Consumer Information Use and Disclaimer   This information is not specific medical advice and does not replace information you receive from your health care provider. This is only a brief summary of general information. It does NOT include all information about conditions, illnesses, injuries, tests, procedures, treatments, therapies, discharge instructions or life-style choices that may apply to you. You must talk with your health care provider for complete information about your health and treatment options. This information should not be used to decide whether or not to accept your  health care providers advice, instructions or recommendations. Only your health care provider has the knowledge and training to provide advice that is right for you.  Copyright   Copyright © 2021 [employer] and its affiliates and/or licensors. All rights reserved.

## 2024-08-15 NOTE — PROCEDURES
Cystoscopy    Date/Time: 8/15/2024 11:30 AM    Performed by: Silvio Granados MD  Authorized by: Silvio Granados MD    Consent Done?:  Yes (Written)  Timeout: prior to procedure the correct patient, procedure, and site was verified    Prep: patient was prepped and draped in usual sterile fashion    Anesthesia:  Intraurethral instillation  Position:  Supine  Anesthesia:  Intraurethral instillation  Patient sedated?: No    Preparation: Patient was prepped and draped in usual sterile fashion    Scope type:  Flexible cystoscope  Stent removed: Yes     Patient tolerated the procedure well with no immediate complications  Comments:      Patient was brought to the procedure room placed on the table in spine position. The patient was prepped and draped in the usual sterile fashion. The cystoscope was inserted into the urethra advanced the urethra and bladder were normal the stent was visualized it was grasped and removed the bladder was inspected found to be free of tumor stone or foreign body.  The patient tolerated the procedure well there were no complications

## 2024-08-15 NOTE — PROGRESS NOTES
Patient given samples of Gemtesa 75 mg-3 weeks. Instructed to take once a day & to stop myrbetriq. Verbalized understanding.

## 2025-01-09 ENCOUNTER — TELEPHONE (OUTPATIENT)
Dept: UROLOGY | Facility: CLINIC | Age: 80
End: 2025-01-09
Payer: MEDICARE

## 2025-01-28 ENCOUNTER — TELEPHONE (OUTPATIENT)
Dept: UROLOGY | Facility: CLINIC | Age: 80
End: 2025-01-28

## 2025-01-28 ENCOUNTER — CLINICAL SUPPORT (OUTPATIENT)
Dept: UROLOGY | Facility: CLINIC | Age: 80
End: 2025-01-28
Payer: MEDICARE

## 2025-01-28 DIAGNOSIS — N39.0 RECURRENT UTI (URINARY TRACT INFECTION): Primary | ICD-10-CM

## 2025-01-28 LAB
BILIRUBIN, UA POC OHS: NEGATIVE
BLOOD, UA POC OHS: ABNORMAL
CLARITY, UA POC OHS: CLEAR
COLOR, UA POC OHS: YELLOW
GLUCOSE, UA POC OHS: NEGATIVE
KETONES, UA POC OHS: NEGATIVE
LEUKOCYTES, UA POC OHS: ABNORMAL
NITRITE, UA POC OHS: NEGATIVE
PH, UA POC OHS: 5.5
PROTEIN, UA POC OHS: NEGATIVE
SPECIFIC GRAVITY, UA POC OHS: 1.01
UROBILINOGEN, UA POC OHS: 0.2

## 2025-01-28 NOTE — TELEPHONE ENCOUNTER
Returned request for call back. States that he is having UTI symptoms-burning, frequency & would like to drop off a urine sample. Appointment set for UA drop off today. Verbalized understanding.                     ----- Message from Vilma sent at 1/28/2025  9:02 AM CST -----  Contact: RADHA BURGESS [[id number]]  ..Type:  Patient Requesting Call    Who Called:RADHA BURGESS [[id number]]  Would the patient rather a call back or a response via [facility]? Call  Best Call Back Number:.[phone] (home)   Additional Information: Patient called to discuss dropping off a urine sample. Patient would like call back regarding this situation.

## 2025-01-28 NOTE — PROGRESS NOTES
Patient came in today for a UA drop off.    Symptoms include: Burning w/ urination, urgency.   How Long have symptoms been occurring: 3 days  Currently taking medications: no  Drug Allergies: none  Preferred Pharmacy/Location: [facility]    We will call patient with results.

## 2025-01-29 DIAGNOSIS — N39.0 RECURRENT UTI (URINARY TRACT INFECTION): Primary | ICD-10-CM

## 2025-01-29 RX ORDER — CIPROFLOXACIN 500 MG/1
500 TABLET ORAL 2 TIMES DAILY
Qty: 20 TABLET | Refills: 0 | Status: SHIPPED | OUTPATIENT
Start: 2025-01-29 | End: 2025-02-08

## 2025-01-29 NOTE — TELEPHONE ENCOUNTER
Spoke with pt, informed her or her urine results, we will start pt on Cipro, which pt states has worked for her in the past, informed pt if she develops symptoms post ABX completion to come back with another sample at least 24-48 hours post. Pt voiced understanding.

## 2025-03-07 ENCOUNTER — OFFICE VISIT (OUTPATIENT)
Dept: UROLOGY | Facility: CLINIC | Age: 80
End: 2025-03-07
Payer: MEDICARE

## 2025-03-07 VITALS
DIASTOLIC BLOOD PRESSURE: 85 MMHG | HEART RATE: 77 BPM | SYSTOLIC BLOOD PRESSURE: 153 MMHG | WEIGHT: 151.88 LBS | HEIGHT: 68 IN | BODY MASS INDEX: 23.02 KG/M2

## 2025-03-07 DIAGNOSIS — N39.41 URGE INCONTINENCE OF URINE: Primary | ICD-10-CM

## 2025-03-07 DIAGNOSIS — N22 CALCULUS OF URINARY TRACT IN DISEASES CLASSIFIED ELSEWHERE: ICD-10-CM

## 2025-03-07 DIAGNOSIS — N39.0 RECURRENT UTI (URINARY TRACT INFECTION): ICD-10-CM

## 2025-03-07 LAB
BILIRUBIN, UA POC OHS: NEGATIVE
BLOOD, UA POC OHS: ABNORMAL
CLARITY, UA POC OHS: CLEAR
COLOR, UA POC OHS: YELLOW
GLUCOSE, UA POC OHS: NEGATIVE
KETONES, UA POC OHS: NEGATIVE
LEUKOCYTES, UA POC OHS: NEGATIVE
NITRITE, UA POC OHS: NEGATIVE
PH, UA POC OHS: 5.5
PROTEIN, UA POC OHS: NEGATIVE
SPECIFIC GRAVITY, UA POC OHS: 1.01
UROBILINOGEN, UA POC OHS: 0.2

## 2025-03-07 RX ORDER — MIRABEGRON 50 MG/1
50 TABLET, FILM COATED, EXTENDED RELEASE ORAL DAILY
Qty: 90 TABLET | Refills: 3 | Status: SHIPPED | OUTPATIENT
Start: 2025-03-07 | End: 2026-03-07

## 2025-03-07 RX ORDER — OXYBUTYNIN CHLORIDE 15 MG/1
5 TABLET, EXTENDED RELEASE ORAL DAILY
COMMUNITY

## 2025-03-07 NOTE — PROGRESS NOTES
Subjective:       Patient ID: Lilliana East is a 79 y.o. female.    Chief Complaint: urge incontinence      HPI: 79-year-old female patient presenting to the clinic to follow up for urinary urge incontinence, frequent urinary tract infection, and kidney stones.  Patient has a history of left PCNL on 07/24/2024 with Dr. Granados.  During a recent hospitalization she was taken off of Myrbetriq according to the patient.  She is currently on oxybutynin tolterodine 4 urinary urge incontinence and having to wear multiple adult depends and change her clothes throughout the day.  She has also tried Gemtesa which did not help her symptoms.  She does state that she did the best on Myrbetriq but she has been off of it for several months now.  She has also attending pelvic floor therapy.    She also has a history of frequent urinary tract infections and was started on Macrobid 100 mg daily at her last visit however the patient does not remember ever taking this medication.  She has had 1 urinary tract infections since her PCNL and doing well overall.    She denies any recent kidney stone pain but she knows that she has multiple stones specifically in the left.         Past Medical History:   Past Medical History:   Diagnosis Date    Cancer     Depression     High cholesterol     Hypertension        Past Surgical Historical:   Past Surgical History:   Procedure Laterality Date    BREAST RECONSTRUCTION      HUMERUS FRACTURE SURGERY      MASTECTOMY          Medications:   Medication List with Changes/Refills   Current Medications    ACETAMINOPHEN (TYLENOL) 500 MG TABLET    Take 500 mg by mouth every 6 (six) hours as needed for Pain.    AMLODIPINE (NORVASC) 5 MG TABLET    Take 5 mg by mouth 2 (two) times daily.    ASCORBIC ACID, VITAMIN C, (VITAMIN C) 1000 MG TABLET    Take 1,000 mg by mouth once daily.    ASCORBIC ACID/COLLAGEN HYDR (COLLAGEN PLUS VITAMIN C ORAL)    Take by mouth.    BUPROPION (WELLBUTRIN XL) 300 MG 24 HR TABLET     Take 300 mg by mouth once daily.    CLOBETASOL-EMOLLIENT 0.05 % CREA    1 tara applied topically 2 times a day    CYANOCOBALAMIN-COBAMAMIDE (B-12 PLUS) 5,000-100 MCG SUBL    1 tab(s) sublingually once a day    DESOXIMETASONE (TOPICORT) 0.25 % CREAM    Apply 1 Application topically 2 (two) times daily.    DOCUSATE SODIUM (COLACE) 50 MG CAPSULE    Take by mouth 2 (two) times daily.    FEXOFENADINE 30 MG TBDL    Take by mouth once daily. 0.5 tablet in am, 0.5 tablet in pm    LACTOBACILLUS RHAMNOSUS GG ORAL    Take 1 capsule by mouth once daily. 60 million units    OLMESARTAN (BENICAR) 40 MG TABLET    1 tab(s) orally once a day    OXYBUTYNIN (DITROPAN XL) 15 MG TR24    Take 5 mg by mouth once daily.    PANTOPRAZOLE (PROTONIX) 40 MG TABLET    1 tab(s) orally once a day    RISANKIZUMAB-RZAA (SKYRIZI) 150 MG/ML SYRG        ROSUVASTATIN (CRESTOR) 10 MG TABLET        TOLTERODINE (DETROL LA) 4 MG 24 HR CAPSULE        TRAMADOL (ULTRAM) 50 MG TABLET    Take 50 mg by mouth every 4 (four) hours as needed for Pain.    TRIAMCINOLONE ACETONIDE 0.1% (KENALOG) 0.1 % PASTE    as directed    TRULICITY 0.75 MG/0.5 ML PEN INJECTOR    as directed subcutaneously once a week    UNABLE TO FIND    Take by mouth once daily. medication name: Biotin   Changed and/or Refilled Medications    Modified Medication Previous Medication    MYRBETRIQ 50 MG TB24 MYRBETRIQ 50 mg Tb24       Take 1 tablet (50 mg total) by mouth once daily.    Take 50 mg by mouth once daily.        Past Social History: Social History[1]    Allergies:   Review of patient's allergies indicates:   Allergen Reactions    Omnicef [cefdinir] Diarrhea     Disoriented        Family History: No family history on file.     Review of Systems:  Review of Systems   Constitutional:  Negative for activity change, appetite change, chills, diaphoresis, fatigue, fever and unexpected weight change.   HENT:  Negative for congestion, dental problem, drooling, ear discharge, ear pain, facial  swelling, hearing loss, mouth sores, nosebleeds, postnasal drip, rhinorrhea, sinus pressure, sinus pain, sneezing, sore throat, tinnitus, trouble swallowing and voice change.    Eyes:  Negative for photophobia, pain, discharge, redness, itching and visual disturbance.   Respiratory:  Negative for apnea, cough, choking, chest tightness, shortness of breath, wheezing and stridor.    Cardiovascular:  Negative for chest pain and leg swelling.   Gastrointestinal:  Negative for abdominal distention, abdominal pain, anal bleeding, blood in stool, constipation, diarrhea, nausea, rectal pain and vomiting.   Endocrine: Negative for cold intolerance, heat intolerance, polydipsia, polyphagia and polyuria.   Genitourinary: Negative.  Negative for decreased urine volume, difficulty urinating, dyspareunia, dysuria, enuresis, flank pain, frequency, genital sores, hematuria, menstrual problem, pelvic pain, urgency, vaginal bleeding, vaginal discharge and vaginal pain.   Musculoskeletal:  Negative for arthralgias, back pain, gait problem, joint swelling, myalgias, neck pain and neck stiffness.   Skin:  Negative for color change, pallor, rash and wound.   Allergic/Immunologic: Negative for environmental allergies, food allergies and immunocompromised state.   Neurological:  Negative for dizziness, tremors, seizures, syncope, facial asymmetry, speech difficulty, weakness, light-headedness, numbness and headaches.   Hematological:  Negative for adenopathy. Does not bruise/bleed easily.   Psychiatric/Behavioral:  Negative for agitation, behavioral problems, confusion, decreased concentration, dysphoric mood, hallucinations, self-injury, sleep disturbance and suicidal ideas. The patient is not nervous/anxious and is not hyperactive.        Physical Exam:  Physical Exam  Exam conducted with a chaperone present.   Constitutional:       Appearance: Normal appearance.   HENT:      Head: Normocephalic.      Nose: Nose normal.       Mouth/Throat:      Mouth: Mucous membranes are moist.      Pharynx: Oropharynx is clear.   Eyes:      Extraocular Movements: Extraocular movements intact.      Conjunctiva/sclera: Conjunctivae normal.      Pupils: Pupils are equal, round, and reactive to light.   Cardiovascular:      Rate and Rhythm: Normal rate and regular rhythm.      Pulses: Normal pulses.      Heart sounds: Normal heart sounds.   Pulmonary:      Effort: Pulmonary effort is normal.      Breath sounds: Normal breath sounds.   Abdominal:      General: Abdomen is flat. Bowel sounds are normal.      Palpations: Abdomen is soft.      Hernia: There is no hernia in the left inguinal area or right inguinal area.   Genitourinary:     General: Normal vulva.      Pubic Area: No rash or pubic lice.       Labia:         Right: No rash, tenderness, lesion or injury.         Left: No rash, tenderness, lesion or injury.       Urethra: No prolapse, urethral pain, urethral swelling or urethral lesion.      Rectum: Normal.   Musculoskeletal:         General: Normal range of motion.      Cervical back: Normal range of motion and neck supple.   Lymphadenopathy:      Lower Body: No right inguinal adenopathy. No left inguinal adenopathy.   Skin:     General: Skin is warm and dry.      Capillary Refill: Capillary refill takes less than 2 seconds.   Neurological:      General: No focal deficit present.      Mental Status: She is alert and oriented to person, place, and time. Mental status is at baseline.   Psychiatric:         Mood and Affect: Mood normal.         Behavior: Behavior normal.         Thought Content: Thought content normal.         Judgment: Judgment normal.         Assessment/Plan:     Frequent urinary tract infection:  Urinalysis negative for infection today.  Patient has done well since her PCNL and has not had any recent infections outside of 1 infection in January.  We will avoid daily antibiotic at this time unless her frequency of infections  increases    Urinary urge incontinence: PVR 0 mL.  Patient has tried oxybutynin, tolterodine, Gemtesa, and Myrbetriq.  She did the best on Myrbetriq but has been off of it for several months at this point.  She is also in pelvic floor therapy.  We will get the patient back on Myrbetriq.  Continue pelvic floor therapy.  Decrease total fluid intake.  Decrease carbonated and caffeinated beverages.  If patient is not happy with symptom improvement when restarting Myrbetriq we will re-evaluate for Botox.    Kidney stones: Patient had left PCNL in July of 2024.  She was told she had multiple other calcifications.  She would like to get a CT to re-evaluate stone burden.  We will notify her of results when they are received  Problem List Items Addressed This Visit    None  Visit Diagnoses         Calculus of urinary tract in diseases classified elsewhere    -  Primary    Relevant Orders    CT Renal Stone Study ABD Pelvis WO                    [1]   Social History  Socioeconomic History    Marital status:    Tobacco Use    Smoking status: Never   Substance and Sexual Activity    Alcohol use: Not Currently    Drug use: Never

## 2025-03-10 ENCOUNTER — TELEPHONE (OUTPATIENT)
Dept: UROLOGY | Facility: CLINIC | Age: 80
End: 2025-03-10
Payer: MEDICARE

## 2025-03-10 NOTE — TELEPHONE ENCOUNTER
Pt calling regarding her urinalysis. I informed pt that she does not have an infection. She states she had blood in her urine. I informed her that she will be having a cat scan and that will tell us more. Pt does have a history of kidney stones.     ----- Message from Luisa sent at 3/10/2025  2:13 PM CDT -----  Type:  Needs Medical AdviceWho Called: ptSymptoms (please be specific): reoccurring uti How long has patient had these symptoms:  ongoingPharmacy name and phone #:  [id number] - [place], [place] - [address] [place] [address]Kynwm: [phone] Fax: [phone] the patient rather a call back or a response via [facility]? callBest Call Back Number: [phone] Information: requesting to speak with nurse regarding reoccuring uti and results and whether she wants to call in antibiotics And also never received call to schedule mri

## 2025-05-01 ENCOUNTER — TELEPHONE (OUTPATIENT)
Dept: UROLOGY | Facility: CLINIC | Age: 80
End: 2025-05-01

## 2025-05-01 ENCOUNTER — CLINICAL SUPPORT (OUTPATIENT)
Dept: UROLOGY | Facility: CLINIC | Age: 80
End: 2025-05-01
Payer: MEDICARE

## 2025-05-01 ENCOUNTER — TELEPHONE (OUTPATIENT)
Dept: UROLOGY | Facility: CLINIC | Age: 80
End: 2025-05-01
Payer: MEDICARE

## 2025-05-01 DIAGNOSIS — N39.0 RECURRENT UTI (URINARY TRACT INFECTION): Primary | ICD-10-CM

## 2025-05-01 LAB
BILIRUBIN, UA POC OHS: NEGATIVE
BLOOD, UA POC OHS: NEGATIVE
CLARITY, UA POC OHS: CLEAR
COLOR, UA POC OHS: YELLOW
GLUCOSE, UA POC OHS: NEGATIVE
KETONES, UA POC OHS: NEGATIVE
LEUKOCYTES, UA POC OHS: ABNORMAL
NITRITE, UA POC OHS: NEGATIVE
PH, UA POC OHS: 7
PROTEIN, UA POC OHS: NEGATIVE
SPECIFIC GRAVITY, UA POC OHS: 1.02
UROBILINOGEN, UA POC OHS: 0.2

## 2025-05-01 RX ORDER — SULFAMETHOXAZOLE AND TRIMETHOPRIM 800; 160 MG/1; MG/1
1 TABLET ORAL 2 TIMES DAILY
Qty: 14 TABLET | Refills: 0 | Status: SHIPPED | OUTPATIENT
Start: 2025-05-01 | End: 2025-05-08

## 2025-05-01 NOTE — TELEPHONE ENCOUNTER
Spoke with pt in regards to u/a results. Informed pt ABX has been sent out to her pharmacy on file, increase hydration. Will contact pt with culture results once received and reviewed. Verbalized understanding.

## 2025-05-01 NOTE — TELEPHONE ENCOUNTER
----- Message from Octavio sent at 5/1/2025 10:15 AM CDT -----  Contact: RADHA BURGESS [[id number]]  .Type:  Patient Requesting CallWho Called:RADHA BURGESS [[id number]]Does the patient know what this is regarding?:pt is calling to speak with nurse about bringing in sample of urineWould the patient rather a call back or a response via [facility]? [facility] Back Number:.[phone] Information:

## 2025-05-01 NOTE — PROGRESS NOTES
"Pt proceeded to clinic for u/a drop off. Per urinalysis questionnaire, s/s of " urgency, leakage, and back pain; occurring x4 days"    "

## 2025-05-01 NOTE — TELEPHONE ENCOUNTER
Complaints of increased urine leakage, back pain- requested drop off UA. Advised drop off UA before 1530 M-Th and 1200 on Friday. Verbalized understanding.

## 2025-05-03 LAB — URINE CULTURE, ROUTINE: NORMAL

## 2025-05-05 ENCOUNTER — RESULTS FOLLOW-UP (OUTPATIENT)
Dept: UROLOGY | Facility: CLINIC | Age: 80
End: 2025-05-05

## 2025-05-05 ENCOUNTER — TELEPHONE (OUTPATIENT)
Dept: UROLOGY | Facility: CLINIC | Age: 80
End: 2025-05-05
Payer: MEDICARE

## 2025-05-05 RX ORDER — AMOXICILLIN AND CLAVULANATE POTASSIUM 875; 125 MG/1; MG/1
1 TABLET, FILM COATED ORAL 2 TIMES DAILY
Qty: 20 TABLET | Refills: 0 | Status: SHIPPED | OUTPATIENT
Start: 2025-05-05 | End: 2025-05-15

## 2025-05-05 NOTE — TELEPHONE ENCOUNTER
Attempted to notify patient of urine culture results showing streptococcus & that augmentin has been sent out to her pharmacy. Instructed to take the complete course of antibiotics to adequately treat her UTI. No answer, all previous information left on voicemail along with our number to call with any questions or concerns.                     ----- Message from Tran Argueta NP sent at 5/5/2025  3:09 PM CDT -----  Please notify patient Augmentin sent to her pharmacy  ----- Message -----  From: Padmini Michelle  Sent: 5/1/2025   1:05 PM CDT  To: Tran Argueta NP

## 2025-05-08 ENCOUNTER — OFFICE VISIT (OUTPATIENT)
Dept: UROLOGY | Facility: CLINIC | Age: 80
End: 2025-05-08
Payer: MEDICARE

## 2025-05-08 DIAGNOSIS — N20.0 KIDNEY STONE: Primary | ICD-10-CM

## 2025-05-08 PROCEDURE — 99215 OFFICE O/P EST HI 40 MIN: CPT | Mod: S$PBB,,, | Performed by: UROLOGY

## 2025-05-08 RX ORDER — ARIPIPRAZOLE 2 MG/1
TABLET ORAL
COMMUNITY
Start: 2025-05-07

## 2025-05-08 RX ORDER — DULOXETIN HYDROCHLORIDE 30 MG/1
CAPSULE, DELAYED RELEASE ORAL
COMMUNITY
Start: 2025-05-07

## 2025-05-08 RX ORDER — DULOXETIN HYDROCHLORIDE 60 MG/1
CAPSULE, DELAYED RELEASE ORAL
COMMUNITY
Start: 2025-04-03

## 2025-05-08 RX ORDER — RISANKIZUMAB-RZAA 150 MG/ML
INJECTION SUBCUTANEOUS
COMMUNITY
Start: 2025-03-14

## 2025-05-08 NOTE — PROGRESS NOTES
Subjective:       Patient ID: Lilliana East is a 79 y.o. female.    Chief Complaint: No chief complaint on file.      HPI: 79-year-old female patient presenting to the clinic to follow up for urinary urge incontinence, frequent urinary tract infection, and kidney stones.  Patient has a history of left PCNL on 07/24/2024 with Dr. Granados.  During a recent hospitalization she was taken off of Myrbetriq according to the patient.  She is currently on oxybutynin tolterodine for urinary urge incontinence and having to wear multiple adult depends and change her clothes throughout the day.  She has also tried Gemtesa which did not help her symptoms.  She does state that she did the best on Myrbetriq but she has been off of it for several months now.  She has also attending pelvic floor therapy.    She also has a history of frequent urinary tract infections and was started on Macrobid 100 mg daily at her last visit however the patient does not remember ever taking this medication.  She has had 1 urinary tract infections since her PCNL and doing well overall.    She denies any recent kidney stone pain but she knows that she has multiple stones specifically in the left.  Recent CT scan shows multiple stones in left kidney measuring up to 7 mm         Past Medical History:   Past Medical History:   Diagnosis Date    Cancer     Depression     High cholesterol     Hypertension     Urinary tract infection        Past Surgical Historical:   Past Surgical History:   Procedure Laterality Date    BREAST RECONSTRUCTION      HUMERUS FRACTURE SURGERY      MASTECTOMY          Medications:   Medication List with Changes/Refills   Current Medications    ACETAMINOPHEN (TYLENOL) 500 MG TABLET    Take 500 mg by mouth every 6 (six) hours as needed for Pain.    AMLODIPINE (NORVASC) 5 MG TABLET    Take 5 mg by mouth 2 (two) times daily.    AMOXICILLIN-CLAVULANATE 875-125MG (AUGMENTIN) 875-125 MG PER TABLET    Take 1 tablet by mouth 2 (two)  times daily. for 10 days    ARIPIPRAZOLE (ABILIFY) 2 MG TAB        ASCORBIC ACID, VITAMIN C, (VITAMIN C) 1000 MG TABLET    Take 1,000 mg by mouth once daily.    ASCORBIC ACID/COLLAGEN HYDR (COLLAGEN PLUS VITAMIN C ORAL)    Take by mouth.    BUPROPION (WELLBUTRIN XL) 300 MG 24 HR TABLET    Take 300 mg by mouth once daily.    CLOBETASOL-EMOLLIENT 0.05 % CREA    1 tara applied topically 2 times a day    CYANOCOBALAMIN-COBAMAMIDE (B-12 PLUS) 5,000-100 MCG SUBL    1 tab(s) sublingually once a day    DESOXIMETASONE (TOPICORT) 0.25 % CREAM    Apply 1 Application topically 2 (two) times daily.    DOCUSATE SODIUM (COLACE) 50 MG CAPSULE    Take by mouth 2 (two) times daily.    DULOXETINE (CYMBALTA) 30 MG CAPSULE        DULOXETINE (CYMBALTA) 60 MG CAPSULE        FEXOFENADINE 30 MG TBDL    Take by mouth once daily. 0.5 tablet in am, 0.5 tablet in pm    LACTOBACILLUS RHAMNOSUS GG ORAL    Take 1 capsule by mouth once daily. 60 million units    MYRBETRIQ 50 MG TB24    Take 1 tablet (50 mg total) by mouth once daily.    OLMESARTAN (BENICAR) 40 MG TABLET    1 tab(s) orally once a day    OXYBUTYNIN (DITROPAN XL) 15 MG TR24    Take 5 mg by mouth once daily.    PANTOPRAZOLE (PROTONIX) 40 MG TABLET    1 tab(s) orally once a day    RISANKIZUMAB-RZAA (SKYRIZI) 150 MG/ML SYRG        ROSUVASTATIN (CRESTOR) 10 MG TABLET        SKYRIZI 150 MG/ML PNIJ        SULFAMETHOXAZOLE-TRIMETHOPRIM 800-160MG (BACTRIM DS) 800-160 MG TAB    Take 1 tablet by mouth 2 (two) times daily. for 7 days    TOLTERODINE (DETROL LA) 4 MG 24 HR CAPSULE        TRAMADOL (ULTRAM) 50 MG TABLET    Take 50 mg by mouth every 4 (four) hours as needed for Pain.    TRIAMCINOLONE ACETONIDE 0.1% (KENALOG) 0.1 % PASTE    as directed    TRULICITY 0.75 MG/0.5 ML PEN INJECTOR    as directed subcutaneously once a week    UNABLE TO FIND    Take by mouth once daily. medication name: Biotin        Past Social History: Social History[1]    Allergies:   Review of patient's allergies  indicates:   Allergen Reactions    Omnicef [cefdinir] Diarrhea     Disoriented        Family History: No family history on file.     Review of Systems:  Review of Systems   Constitutional:  Negative for activity change, appetite change, chills, diaphoresis, fatigue, fever and unexpected weight change.   HENT:  Negative for congestion, dental problem, drooling, ear discharge, ear pain, facial swelling, hearing loss, mouth sores, nosebleeds, postnasal drip, rhinorrhea, sinus pressure, sinus pain, sneezing, sore throat, tinnitus, trouble swallowing and voice change.    Eyes:  Negative for photophobia, pain, discharge, redness, itching and visual disturbance.   Respiratory:  Negative for apnea, cough, choking, chest tightness, shortness of breath, wheezing and stridor.    Cardiovascular:  Negative for chest pain and leg swelling.   Gastrointestinal:  Negative for abdominal distention, abdominal pain, anal bleeding, blood in stool, constipation, diarrhea, nausea, rectal pain and vomiting.   Endocrine: Negative for cold intolerance, heat intolerance, polydipsia, polyphagia and polyuria.   Genitourinary: Negative.  Negative for decreased urine volume, difficulty urinating, dyspareunia, dysuria, enuresis, flank pain, frequency, genital sores, hematuria, menstrual problem, pelvic pain, urgency, vaginal bleeding, vaginal discharge and vaginal pain.   Musculoskeletal:  Negative for arthralgias, back pain, gait problem, joint swelling, myalgias, neck pain and neck stiffness.   Skin:  Negative for color change, pallor, rash and wound.   Allergic/Immunologic: Negative for environmental allergies, food allergies and immunocompromised state.   Neurological:  Negative for dizziness, tremors, seizures, syncope, facial asymmetry, speech difficulty, weakness, light-headedness, numbness and headaches.   Hematological:  Negative for adenopathy. Does not bruise/bleed easily.   Psychiatric/Behavioral:  Negative for agitation, behavioral  problems, confusion, decreased concentration, dysphoric mood, hallucinations, self-injury, sleep disturbance and suicidal ideas. The patient is not nervous/anxious and is not hyperactive.        Physical Exam:  Physical Exam  Exam conducted with a chaperone present.   Constitutional:       Appearance: Normal appearance.   HENT:      Head: Normocephalic.      Nose: Nose normal.      Mouth/Throat:      Mouth: Mucous membranes are moist.      Pharynx: Oropharynx is clear.   Eyes:      Extraocular Movements: Extraocular movements intact.      Conjunctiva/sclera: Conjunctivae normal.      Pupils: Pupils are equal, round, and reactive to light.   Cardiovascular:      Rate and Rhythm: Normal rate and regular rhythm.      Pulses: Normal pulses.      Heart sounds: Normal heart sounds.   Pulmonary:      Effort: Pulmonary effort is normal.      Breath sounds: Normal breath sounds.   Abdominal:      General: Abdomen is flat. Bowel sounds are normal.      Palpations: Abdomen is soft.      Hernia: There is no hernia in the left inguinal area or right inguinal area.   Genitourinary:     General: Normal vulva.      Pubic Area: No rash or pubic lice.       Labia:         Right: No rash, tenderness, lesion or injury.         Left: No rash, tenderness, lesion or injury.       Urethra: No prolapse, urethral pain, urethral swelling or urethral lesion.      Rectum: Normal.   Musculoskeletal:         General: Normal range of motion.      Cervical back: Normal range of motion and neck supple.   Lymphadenopathy:      Lower Body: No right inguinal adenopathy. No left inguinal adenopathy.   Skin:     General: Skin is warm and dry.      Capillary Refill: Capillary refill takes less than 2 seconds.   Neurological:      General: No focal deficit present.      Mental Status: She is alert and oriented to person, place, and time. Mental status is at baseline.   Psychiatric:         Mood and Affect: Mood normal.         Behavior: Behavior normal.          Thought Content: Thought content normal.         Judgment: Judgment normal.         Assessment/Plan:     Frequent urinary tract infection:  Urinalysis negative for infection today.  Patient has done well since her PCNL and has not had any recent infections outside of 1 infection in January and went infection currently.  We will avoid daily antibiotic at this time unless her frequency of infections increases    Urinary urge incontinence: PVR 0 mL.  Patient has tried oxybutynin, tolterodine, Gemtesa, and Myrbetriq.  She did the best on Myrbetriq but has been off of it for several months at this point.  She is also in pelvic floor therapy.  We will get the patient back on Myrbetriq.  Continue pelvic floor therapy.  Decrease total fluid intake.  Decrease carbonated and caffeinated beverages.  If patient is not happy with symptom improvement when restarting Myrbetriq we will re-evaluate for Botox.    Kidney stones: Patient had left PCNL in July of 2024.  She was told she had multiple other calcifications.  She would like to get a CT to re-evaluate stone burden.  CT noted multiple stones in left kidney measuring up to 7 mm.  Will proceed with ureteroscopy when the patient is ready.  She will call to let us know  Problem List Items Addressed This Visit    None                  [1]   Social History  Socioeconomic History    Marital status:    Tobacco Use    Smoking status: Never   Substance and Sexual Activity    Alcohol use: Not Currently    Drug use: Never

## 2025-05-27 ENCOUNTER — TELEPHONE (OUTPATIENT)
Dept: UROLOGY | Facility: CLINIC | Age: 80
End: 2025-05-27
Payer: MEDICARE

## 2025-05-27 DIAGNOSIS — N22 CALCULUS OF URINARY TRACT IN DISEASES CLASSIFIED ELSEWHERE: Primary | ICD-10-CM

## 2025-05-27 NOTE — TELEPHONE ENCOUNTER
Pt states URS was discussed but also need for new CT.    She is ready to proceed with CT.  Not certain which CT you would prefer.

## 2025-05-27 NOTE — TELEPHONE ENCOUNTER
----- Message from Gema sent at 5/27/2025 10:25 AM CDT -----  Contact: self  Type:  Needs Medical AdviceWho Called: Lilliana Hernándezould the patient rather a call back or a response via [facility]? Call [facility] Call Back Number: [phone] Information: pt stating she was told to call back once she was ready to schedule CT scan. Stating she is now ready to schedule

## 2025-05-29 ENCOUNTER — CLINICAL SUPPORT (OUTPATIENT)
Dept: UROLOGY | Facility: CLINIC | Age: 80
End: 2025-05-29
Payer: MEDICARE

## 2025-05-29 ENCOUNTER — TELEPHONE (OUTPATIENT)
Dept: UROLOGY | Facility: CLINIC | Age: 80
End: 2025-05-29

## 2025-05-29 DIAGNOSIS — N39.41 URGE INCONTINENCE OF URINE: Primary | ICD-10-CM

## 2025-05-29 DIAGNOSIS — R10.9 ABDOMINAL PAIN, UNSPECIFIED ABDOMINAL LOCATION: Primary | ICD-10-CM

## 2025-05-29 LAB
BILIRUBIN, UA POC OHS: NEGATIVE
BLOOD, UA POC OHS: ABNORMAL
CLARITY, UA POC OHS: CLEAR
COLOR, UA POC OHS: YELLOW
GLUCOSE, UA POC OHS: NEGATIVE
KETONES, UA POC OHS: NEGATIVE
LEUKOCYTES, UA POC OHS: NEGATIVE
NITRITE, UA POC OHS: NEGATIVE
PH, UA POC OHS: 6.5
PROTEIN, UA POC OHS: NEGATIVE
SPECIFIC GRAVITY, UA POC OHS: 1.01
UROBILINOGEN, UA POC OHS: 0.2

## 2025-05-29 NOTE — PROGRESS NOTES
Patient came in today for a UA drop off.    Symptoms include: Urgency, Incontinence   How Long have symptoms been occurrin week  Currently taking medications: no  Drug Allergies: none  Preferred Pharmacy/Location: [facility] [place]    We will call patient with results.

## 2025-05-29 NOTE — TELEPHONE ENCOUNTER
RTC, informed pt of urine results, no infection, however her symptoms are probably from a kidney stone. CT STAT ordered, pt informed on location. We will call pt with CT results. Pt voiced understanding.       Copied from CRM #[id number]. Topic: General Inquiry - Patient Advice  >> May 29, 2025  9:17 AM Vilma wrote:  ..Type:  Patient Requesting Call    Who Called:Lilliaan East  Would the patient rather a call back or a response via [facility]? Call  Best Call Back Number:.[phone] (home)  Additional Information: Patient called to discuss the location of her referral for a MRI/CAT scan.

## 2025-05-30 ENCOUNTER — RESULTS FOLLOW-UP (OUTPATIENT)
Dept: UROLOGY | Facility: CLINIC | Age: 80
End: 2025-05-30

## 2025-05-30 ENCOUNTER — TELEPHONE (OUTPATIENT)
Dept: UROLOGY | Facility: CLINIC | Age: 80
End: 2025-05-30
Payer: MEDICARE

## 2025-05-30 DIAGNOSIS — N20.0 KIDNEY STONE: Primary | ICD-10-CM

## 2025-05-30 NOTE — TELEPHONE ENCOUNTER
Notified of CT results that show a 7 mm left lower pole stone that could be treated with a ureteroscopy. Patient is agreeable to proceed with ureteroscopy. Surgery date chosen Wednesday, June 11, 2025. Surgery order placed. Surgery checklist done & given to Fang. Nurse visit set for Wednesday, June 4, 2025 at 2:30 PM to sign consents. Will need medical clearance. Patient has an appointment with her primary MD Delfina Duncan on Monday, June 9, 2025. Verbalized understanding. Patient is on trulicity injection weekly. Instructed that she needed to be off of medication for 1 week prior to her surgery. Normally takes on Wednesdays. Will take next Tuesday instead so she will meet the 7 day requirement.                   ----- Message from Silvio Granados MD sent at 5/30/2025 10:25 AM CDT -----  Patient has a 7 mm left lower pole stone that would be easily treated with ureteroscopy please offer that to the patient if she would like that done  ----- Message -----  From: Mony Oneil  Sent: 5/30/2025   7:30 AM CDT  To: Silvio Granados MD

## 2025-06-04 ENCOUNTER — CLINICAL SUPPORT (OUTPATIENT)
Dept: UROLOGY | Facility: CLINIC | Age: 80
End: 2025-06-04
Payer: MEDICARE

## 2025-06-04 DIAGNOSIS — N20.0 KIDNEY STONE: Primary | ICD-10-CM

## 2025-06-11 ENCOUNTER — OUTSIDE PLACE OF SERVICE (OUTPATIENT)
Dept: UROLOGY | Facility: CLINIC | Age: 80
End: 2025-06-11

## 2025-06-11 DIAGNOSIS — N20.0 KIDNEY STONE: Primary | ICD-10-CM

## 2025-06-11 RX ORDER — CIPROFLOXACIN 500 MG/1
500 TABLET, FILM COATED ORAL 2 TIMES DAILY
Qty: 6 TABLET | Refills: 0 | Status: SHIPPED | OUTPATIENT
Start: 2025-06-11 | End: 2025-06-14

## 2025-06-11 RX ORDER — HYDROCODONE BITARTRATE AND ACETAMINOPHEN 10; 325 MG/1; MG/1
1 TABLET ORAL EVERY 6 HOURS PRN
Qty: 12 TABLET | Refills: 0 | Status: SHIPPED | OUTPATIENT
Start: 2025-06-11

## 2025-07-08 ENCOUNTER — CLINICAL SUPPORT (OUTPATIENT)
Dept: UROLOGY | Facility: CLINIC | Age: 80
End: 2025-07-08
Payer: MEDICARE

## 2025-07-08 ENCOUNTER — TELEPHONE (OUTPATIENT)
Dept: UROLOGY | Facility: CLINIC | Age: 80
End: 2025-07-08

## 2025-07-08 DIAGNOSIS — N39.0 RECURRENT UTI (URINARY TRACT INFECTION): Primary | ICD-10-CM

## 2025-07-08 DIAGNOSIS — N20.0 KIDNEY STONE: Primary | ICD-10-CM

## 2025-07-08 LAB
BILIRUBIN, UA POC OHS: NEGATIVE
BLOOD, UA POC OHS: ABNORMAL
CLARITY, UA POC OHS: ABNORMAL
COLOR, UA POC OHS: YELLOW
GLUCOSE, UA POC OHS: NEGATIVE
KETONES, UA POC OHS: ABNORMAL
LEUKOCYTES, UA POC OHS: ABNORMAL
NITRITE, UA POC OHS: NEGATIVE
PH, UA POC OHS: 6
PROTEIN, UA POC OHS: >=300
SPECIFIC GRAVITY, UA POC OHS: 1.02
UROBILINOGEN, UA POC OHS: 0.2

## 2025-07-08 RX ORDER — AMOXICILLIN AND CLAVULANATE POTASSIUM 875; 125 MG/1; MG/1
1 TABLET, FILM COATED ORAL 2 TIMES DAILY
Qty: 20 TABLET | Refills: 0 | Status: SHIPPED | OUTPATIENT
Start: 2025-07-08 | End: 2025-07-18

## 2025-07-08 NOTE — TELEPHONE ENCOUNTER
Spoke with pt regarding u/a results. Per verbal Tran CHONG NP Augmentin sent to pharmacy as requested by pt. Informed pt the u/a is being sent off for a culture, once received and reviewed we will contact pt with results. Advised to drink adequate amount of water. Pt verbalized understanding.JIMMY

## 2025-07-08 NOTE — PROGRESS NOTES
"Pt proceeded to clinic for u/a drop off. Per urinalysis questionnaire C/O " Bloody urine, back pain occurring x1 week"     "

## 2025-07-24 ENCOUNTER — PROCEDURE VISIT (OUTPATIENT)
Dept: UROLOGY | Facility: CLINIC | Age: 80
End: 2025-07-24
Payer: MEDICARE

## 2025-07-24 VITALS
RESPIRATION RATE: 20 BRPM | OXYGEN SATURATION: 97 % | SYSTOLIC BLOOD PRESSURE: 136 MMHG | BODY MASS INDEX: 23.82 KG/M2 | HEART RATE: 80 BPM | WEIGHT: 157.19 LBS | HEIGHT: 68 IN | DIASTOLIC BLOOD PRESSURE: 65 MMHG

## 2025-07-24 DIAGNOSIS — N20.0 KIDNEY STONE: ICD-10-CM

## 2025-07-24 DIAGNOSIS — Z79.2 PROPHYLACTIC ANTIBIOTIC: Primary | ICD-10-CM

## 2025-07-24 RX ORDER — DOCUSATE SODIUM 100 MG/1
100 CAPSULE, LIQUID FILLED ORAL 2 TIMES DAILY
COMMUNITY

## 2025-07-24 RX ORDER — ACETAMINOPHEN AND PHENYLEPHRINE HCL 325; 5 MG/1; MG/1
10 TABLET ORAL DAILY
COMMUNITY

## 2025-07-24 RX ORDER — FEXOFENADINE HCL 180 MG/1
180 TABLET ORAL DAILY
COMMUNITY

## 2025-07-24 RX ORDER — CLOBETASOL PROPIONATE 0.5 MG/G
1 EMULSION TOPICAL 2 TIMES DAILY
COMMUNITY

## 2025-07-24 RX ORDER — CIPROFLOXACIN 500 MG/1
500 TABLET, FILM COATED ORAL
Status: COMPLETED | OUTPATIENT
Start: 2025-07-24 | End: 2025-07-24

## 2025-07-24 RX ORDER — TRIAMCINOLONE ACETONIDE 1 MG/G
1 PASTE DENTAL
COMMUNITY

## 2025-07-24 RX ORDER — DESOXIMETASONE 2.5 MG/G
1 CREAM TOPICAL 2 TIMES DAILY
COMMUNITY

## 2025-07-24 RX ADMIN — CIPROFLOXACIN 500 MG: 500 TABLET, FILM COATED ORAL at 01:07

## 2025-07-24 NOTE — PATIENT INSTRUCTIONS
Patient Education       Cystoscopy Discharge Instructions   About this topic   Your kidneys make urine. It is stored in your bladder. The urethra is a tube at the bottom of the bladder. Urine flows out of this tube. Sometimes, there is a blockage and urine is not able to leave the body.  A cystoscopy is a procedure that lets the doctor see the inside of your bladder and urethra. The doctor does it to:  Look for stones or tumors blocking the bladder and urethra  Look for changes or injury inside the bladder  Take a tissue sample from the inside of your bladder  Look for reasons for blood in the urine, pain with urination, or why you are passing urine often  Look for prostate problems     What care is needed at home?   Ask your doctor what you need to do when you go home. Make sure you ask questions if you do not understand what the doctor says. This way you will know what you need to do.  Take a warm bath or use a warm wet washcloth over the opening to the urethra. This will help to ease any pain. Do this as needed.  Drink 6 to 8 glasses of water a day and 3 to 4 glasses in the first few hours after the procedure to flush out your bladder and reduce irritation.  You may see some blood in your urine for a few days. This is normal.  Empty your bladder as soon as you feel the need to. Don't delay going to the bathroom. It stretches and weakens the bladder.  What follow-up care is needed?   Your doctor may ask you to make visits to the office to check on your progress. Be sure to keep these visits.  If you had a biopsy, talk with your doctor about the results.  What drugs may be needed?   The doctor may order drugs to:  Help with pain  Fight an infection  Help with bladder spasms  Will physical activity be limited?   Talk to your doctor about when you may go back to your normal activities like work, driving, or sex.  What problems could happen?   Bleeding  Infection  Injury to the bladder and urethra  Discomfort in the  urethra area  Burning sensation for a short time  Upset stomach  When do I need to call the doctor?   Signs of infection. These include a fever of 100.4°F (38°C) or higher, chills, pain with passing urine.  Pain that does not go away even with drugs or that lasts longer than 2 days  Too much blood in your urine  Passing large dime-sized clots  Cloudy urine  Little or no urine or not able to pass urine  Abdominal pain and nausea  Teach Back: Helping You Understand   The Teach Back Method helps you understand the information we are giving you. After you talk with the staff, tell them in your own words what you learned. This helps to make sure the staff has described each thing clearly. It also helps to explain things that may have been confusing. Before going home, make sure you can do these:  I can tell you about my procedure.  I can tell you what may help ease my pain.  I can tell you what I will do if I have a fever, chills, or am not able to pass urine.  Where can I learn more?   [employer]  https://www.cancer.org/treatment/understanding-your-diagnosis/tests/endoscopy/cystoscopy.html   [employer]  https://www.cancerresearchuk.org/about-cancer/bladder-cancer/getting-diagnosed/tests-diagnose/cystoscopy   [facility] Choices  http://www.nhs.uk/conditions/Cystoscopy/Pages/Introduction.aspx   Last Reviewed Date   2021-04-22  Consumer Information Use and Disclaimer   This information is not specific medical advice and does not replace information you receive from your health care provider. This is only a brief summary of general information. It does NOT include all information about conditions, illnesses, injuries, tests, procedures, treatments, therapies, discharge instructions or life-style choices that may apply to you. You must talk with your health care provider for complete information about your health and treatment options. This information should not be used to decide whether or not to accept your  health care providers advice, instructions or recommendations. Only your health care provider has the knowledge and training to provide advice that is right for you.  Copyright   Copyright © 2021 [employer] and its affiliates and/or licensors. All rights reserved.

## 2025-07-24 NOTE — PROCEDURES
Cystoscopy    Date/Time: 7/24/2025 1:30 PM    Performed by: Silvio Granados MD  Authorized by: Silvio Granados MD    Timeout: prior to procedure the correct patient, procedure, and site was verified    Prep: patient was prepped and draped in usual sterile fashion    Anesthesia:  Intraurethral instillation  Position:  Supine  Anesthesia:  Intraurethral instillation  Patient sedated?: No    Preparation: Patient was prepped and draped in usual sterile fashion    Scope type:  Flexible cystoscope  Stent removed: Yes     Patient tolerated the procedure well with no immediate complications  Comments:      Patient was brought to the procedure room placed on the table in spine position. The patient was prepped and draped in the usual sterile fashion. The cystoscope was inserted into the urethra advanced the urethra and bladder were normal the stent was visualized it was grasped and removed the bladder was inspected found to be free of tumor stone or foreign body.  The patient tolerated the procedure well there were no complications